# Patient Record
Sex: FEMALE | Race: WHITE | Employment: FULL TIME | URBAN - METROPOLITAN AREA
[De-identification: names, ages, dates, MRNs, and addresses within clinical notes are randomized per-mention and may not be internally consistent; named-entity substitution may affect disease eponyms.]

---
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
## 2019-06-10 ENCOUNTER — OFFICE VISIT (OUTPATIENT)
Dept: FAMILY MEDICINE CLINIC | Age: 19
End: 2019-06-10
Payer: COMMERCIAL

## 2019-06-10 VITALS
WEIGHT: 129.3 LBS | SYSTOLIC BLOOD PRESSURE: 116 MMHG | RESPIRATION RATE: 16 BRPM | DIASTOLIC BLOOD PRESSURE: 76 MMHG | HEIGHT: 67 IN | HEART RATE: 68 BPM | BODY MASS INDEX: 20.29 KG/M2

## 2019-06-10 DIAGNOSIS — Z00.00 WELL ADULT HEALTH CHECK: Primary | ICD-10-CM

## 2019-06-10 PROCEDURE — 99395 PREV VISIT EST AGE 18-39: CPT | Performed by: FAMILY MEDICINE

## 2019-06-10 ASSESSMENT — PATIENT HEALTH QUESTIONNAIRE - PHQ9
SUM OF ALL RESPONSES TO PHQ QUESTIONS 1-9: 0
SUM OF ALL RESPONSES TO PHQ QUESTIONS 1-9: 0
SUM OF ALL RESPONSES TO PHQ9 QUESTIONS 1 & 2: 0
2. FEELING DOWN, DEPRESSED OR HOPELESS: 0
1. LITTLE INTEREST OR PLEASURE IN DOING THINGS: 0

## 2019-06-10 ASSESSMENT — ENCOUNTER SYMPTOMS
RESPIRATORY NEGATIVE: 1
GASTROINTESTINAL NEGATIVE: 1

## 2019-06-10 NOTE — PROGRESS NOTES
Subjective:      Patient ID: Clare Almaguer is a 19 y.o. female. HPI:    Chief Complaint   Patient presents with    Annual Exam    Forms     Nanostellar     Annual eval.  Doing well overall. [employer] physical.    Pt denies CP, SOB, palpitations with exertion. Denies syncopal episodes in the past.  Denies hx of heart murmur. No family hx of early CAD or cardiac death. Denies hx of concussion. Denies hx of asthma. No hx of fractures or restriction of play. Periods regular. There is no problem list on file for this patient. No past surgical history on file.   Prior to Admission medications    Not on File       No results found for: LABA1C  No results found for: EAG    No components found for: CHLPL  No results found for: TRIG  No results found for: HDL  No results found for: LDLCALC  No results found for: LABVLDL      Chemistry    No results found for: NA, K, CL, CO2, BUN, CREATININE No results found for: CALCIUM, ALKPHOS, AST, ALT, BILITOT         No results found for: TSH, R9IGLFX, G2CLWWI, THYROIDAB    No results found for: WBC, HGB, HCT, MCV, PLT      Health Maintenance   Topic Date Due    DTaP/Tdap/Td vaccine (6 - Tdap) 06/19/2009    HIV screen  06/19/2013    Chlamydia screen  06/19/2014    Flu vaccine (Season Ended) 09/01/2019    HPV vaccine  Completed    Varicella Vaccine  Completed    Meningococcal (ACWY) Vaccine  Completed    Pneumococcal 0-64 years Vaccine  Aged [facility] History   Administered Date(s) Administered    DTaP 1998, 1998, 01/06/1999, 12/29/1999, 06/20/2003    HPV Gardasil Quadrivalent 05/31/2012, 08/02/2012, 12/03/2012    Hepatitis B, unspecified formulation 1998, 1998, 01/06/1999    Hib, unspecified formulation 1998, 1998, 01/06/1999, 12/29/1999    IPV (Ipol) 1998, 1998, 06/24/1999, 06/20/2003    MMR 06/24/1999, 06/20/2003    Meningococcal MCV4P (Menactra) 06/27/2011, 06/27/2014    PPD Test

## 2019-06-10 NOTE — PROGRESS NOTES
Visit Information    Have you changed or started any medications since your last visit including any over-the-counter medicines, vitamins, or herbal medicines? no   Are you having any side effects from any of your medications? -  no  Have you stopped taking any of your medications? Is so, why? -  no    Have you seen any other physician or provider since your last visit? No  Have you had any other diagnostic tests since your last visit? No  Have you been seen in the emergency room and/or had an admission to a hospital since we last saw you? No  Have you had your routine dental cleaning in the past 6 months? yes - couple weeks ago    Have you activated your [employer] account? If not, what are your barriers?  Yes     Patient Care Team:  Yomaira Mccain DO as PCP - General (Family Medicine)  Yomaira Mccain DO as PCP - [facility] Provider    Medical History Review  Past Medical, Family, and Social History reviewed and does not contribute to the patient presenting condition    Health Maintenance   Topic Date Due    DTaP/Tdap/Td vaccine (6 - Tdap) 06/19/2009    HIV screen  06/19/2013    Chlamydia screen  06/19/2014    Flu vaccine (Season Ended) 09/01/2019    HPV vaccine  Completed    Varicella Vaccine  Completed    Meningococcal (ACWY) Vaccine  Completed    Pneumococcal 0-64 years Vaccine  Aged Out

## 2019-06-12 LAB — MISCELLANEOUS LAB TEST ORDER: NORMAL

## 2019-06-16 ENCOUNTER — HOSPITAL ENCOUNTER (EMERGENCY)
Age: 19
Discharge: HOME | End: 2019-06-16
Payer: COMMERCIAL

## 2019-06-16 VITALS
WEIGHT: 130 LBS | HEART RATE: 115 BPM | HEIGHT: 67 IN | TEMPERATURE: 98 F | OXYGEN SATURATION: 100 % | BODY MASS INDEX: 20.4 KG/M2 | SYSTOLIC BLOOD PRESSURE: 140 MMHG | DIASTOLIC BLOOD PRESSURE: 76 MMHG | RESPIRATION RATE: 16 BRPM

## 2019-06-16 DIAGNOSIS — J01.00 ACUTE MAXILLARY SINUSITIS, RECURRENCE NOT SPECIFIED: Primary | ICD-10-CM

## 2019-06-16 PROCEDURE — 99214 OFFICE O/P EST MOD 30 MIN: CPT | Performed by: NURSE PRACTITIONER

## 2019-06-16 PROCEDURE — 99213 OFFICE O/P EST LOW 20 MIN: CPT

## 2019-06-16 RX ORDER — PREDNISONE 20 MG/1
40 TABLET ORAL DAILY
Qty: 10 TABLET | Refills: 0 | Status: SHIPPED | OUTPATIENT
Start: 2019-06-16 | End: 2019-06-21

## 2019-06-16 RX ORDER — AMOXICILLIN AND CLAVULANATE POTASSIUM 875; 125 MG/1; MG/1
1 TABLET, FILM COATED ORAL 2 TIMES DAILY
Qty: 14 TABLET | Refills: 0 | Status: SHIPPED | OUTPATIENT
Start: 2019-06-16 | End: 2019-06-23

## 2019-06-16 ASSESSMENT — ENCOUNTER SYMPTOMS
COUGH: 1
NAUSEA: 0
SINUS PRESSURE: 1
RHINORRHEA: 0
CHEST TIGHTNESS: 0
SHORTNESS OF BREATH: 0
SORE THROAT: 1
VOMITING: 0
DIARRHEA: 0
SINUS PAIN: 0

## 2019-06-16 ASSESSMENT — PAIN DESCRIPTION - LOCATION: LOCATION: CHEST

## 2019-06-16 ASSESSMENT — PAIN SCALES - GENERAL: PAINLEVEL_OUTOF10: 5

## 2019-06-16 NOTE — ED PROVIDER NOTES
[place] [address]  Urgent Care Encounter       CHIEF COMPLAINT       Chief Complaint   Patient presents with    Cough    Headache       Nurses Notes reviewed and I agree except as noted in the HPI. HISTORY OF PRESENT Nadeen Jiménez is a 19 y.o. female who presents with complaints of cough and sinus headaches for the past 2 days. Patient reports sinus pressure around her eyes and a sore throat as well. She reports clear nasal discharge. Appetite has been decreased today. No otalgia, nausea or vomiting. She does report feeling hot and having occasional chills and body aches. The history is provided by the patient. REVIEW OF SYSTEMS     Review of Systems   Constitutional: Positive for appetite change and chills. Negative for fatigue and fever. HENT: Positive for congestion, postnasal drip, sinus pressure and sore throat. Negative for ear pain, rhinorrhea and sinus pain. Respiratory: Positive for cough. Negative for chest tightness and shortness of breath. Cardiovascular: Negative for chest pain. Gastrointestinal: Negative for diarrhea, nausea and vomiting. Musculoskeletal: Positive for myalgias. Neurological: Positive for headaches. Negative for dizziness. PAST MEDICAL HISTORY   History reviewed. No pertinent past medical history. SURGICALHISTORY     Patient  has no past surgical history on file. CURRENT MEDICATIONS       Discharge Medication List as of 6/16/2019  6:00 PM          ALLERGIES     Patient is has No Known Allergies.     Patients   Immunization History   Administered Date(s) Administered    DTaP 1998, 1998, 01/06/1999, 12/29/1999, 06/20/2003    HPV Gardasil Quadrivalent 05/31/2012, 08/02/2012, 12/03/2012    Hepatitis B, unspecified formulation 1998, 1998, 01/06/1999    Hib, unspecified formulation 1998, 1998, 01/06/1999, 12/29/1999    IPV (Ipol) 1998, 1998, 06/24/1999, 06/20/2003    MMR 06/24/1999, 06/20/2003    Meningococcal MCV4P (Menactra) 06/27/2011, 06/27/2014    PPD Test 06/24/1999    Varicella (Varivax) 06/24/1999, 06/23/2010       FAMILY HISTORY     Patient's family history includes Cancer in her maternal grandmother; Other in her mother. SOCIAL HISTORY     Patient  reports that she has never smoked. She has never used smokeless tobacco.    PHYSICAL EXAM     ED TRIAGE VITALS  BP: (!) 140/76, Temp: 98 °F (36.7 °C), Pulse: 115, Resp: 16, SpO2: 100 %,Estimated body mass index is 20.36 kg/m² as calculated from the following:    Height as of this encounter: 5' 7\" (1.702 m). Weight as of this encounter: 130 lb (59 kg). ,Patient's last menstrual period was 06/01/2019. Physical Exam   Constitutional: She is oriented to person, place, and time. She appears well-developed and well-nourished. She is cooperative. She does not appear ill. No distress. HENT:   Head: Normocephalic and atraumatic. Right Ear: Tympanic membrane and ear canal normal.   Left Ear: Tympanic membrane and ear canal normal.   Nose: Right sinus exhibits maxillary sinus tenderness. Right sinus exhibits no frontal sinus tenderness. Left sinus exhibits maxillary sinus tenderness. Left sinus exhibits no frontal sinus tenderness. Mouth/Throat: Uvula is midline and mucous membranes are normal. Posterior oropharyngeal erythema present. Eyes: Lids are normal. Right conjunctiva is not injected. Left conjunctiva is not injected. No scleral icterus. Pupils are equal.   Cardiovascular: Normal rate, regular rhythm, S1 normal, S2 normal and normal heart sounds. No pedal edema   Pulmonary/Chest: Effort normal and breath sounds normal. No respiratory distress. Musculoskeletal:   Normal active ROM x 4 extremities  Gait steady   Lymphadenopathy:        Head (right side): Tonsillar adenopathy present. She has no cervical adenopathy. Neurological: She is alert and oriented to person, place, and time. No sensory deficit. Answers questions readily and appropriately   Skin: Skin is warm and dry. No rash (to exposed areas of skin) noted. No cyanosis. Nails show no clubbing. Psychiatric: She has a normal mood and affect. Her speech is normal and behavior is normal.   Nursing note and vitals reviewed. DIAGNOSTIC RESULTS     Labs:No results found for this visit on 06/16/19. IMAGING:    No orders to display         URGENT CARE COURSE:     Vitals:    06/16/19 1725 06/16/19 1726   BP: (!) 140/76    Pulse: 122 115   Resp: 16    Temp: 98 °F (36.7 °C)    TempSrc: Temporal    SpO2: 100%    Weight: 130 lb (59 kg)    Height: 5' 7\" (1.702 m)        Medications - No data to display         PROCEDURES:  None    FINAL IMPRESSION      1. Acute maxillary sinusitis, recurrence not specified          DISPOSITION/ PLAN     Plenty of fluids orally. Salt water gargles as needed for sore throat or OTC throat spray/lozenges. Cool mist humidifier at bedside for congestion. Saline rinses as needed for nasal congestion. May take Tylenol and/or Ibuprofen for fever or body aches as directed. May take OTC antihistamines/ decongestant as needed. Follow up with family doctor as needed. Pt to go to ER if symptoms worsen, new symptoms develop, high fever >102, vomiting, breathing difficulty, lethargy. Take antibiotic as prescribed until gone. Do not stop taking even if your symptoms have improved. Prednisone as prescribed for the full 5 days. Patient with acute sinusitis and will be treated with Augmentin ×7 days and prednisone burst ×5 days. Continue with the over-the-counter decongestants as desired. Follow-up with family doctor in the next week if not improved. Further instructions outlined above. All the patient's questions answered. The patient/parent agreed with the plan. The patient was discharged from the [facility] in good condition.       PATIENT REFERRED TO:  [facility] [address] / [facility] [address]      DISCHARGE MEDICATIONS:  Discharge Medication List as of 6/16/2019  6:00 PM      START taking these medications    Details   amoxicillin-clavulanate (AUGMENTIN) 875-125 MG per tablet Take 1 tablet by mouth 2 times daily for 7 days, Disp-14 tablet, R-0Normal      predniSONE (DELTASONE) 20 MG tablet Take 2 tablets by mouth daily for 5 days, Disp-10 tablet, R-0Normal             Discharge Medication List as of 6/16/2019  6:00 PM          Discharge Medication List as of 6/16/2019  6:00 PM          TL Lindsay CNP    (Please note that portions of this note were completed with a voice recognition program. Efforts were made to edit the dictations but occasionally words are mis-transcribed.)         TL Lindsay CNP  06/17/19 8783

## 2019-06-17 ENCOUNTER — TELEPHONE (OUTPATIENT)
Dept: FAMILY MEDICINE CLINIC | Age: 19
End: 2019-06-17

## 2019-06-24 ENCOUNTER — TELEPHONE (OUTPATIENT)
Dept: FAMILY MEDICINE CLINIC | Age: 19
End: 2019-06-24

## 2019-06-25 NOTE — TELEPHONE ENCOUNTER
Mikayla Letters called back and asked for me to mail results to [address], [address] [place] [address]. Mailed today.

## 2019-07-01 ENCOUNTER — TELEPHONE (OUTPATIENT)
Dept: FAMILY MEDICINE CLINIC | Age: 19
End: 2019-07-01

## 2019-07-30 ENCOUNTER — TELEPHONE (OUTPATIENT)
Dept: FAMILY MEDICINE CLINIC | Age: 19
End: 2019-07-30

## 2019-07-30 RX ORDER — NORGESTIMATE AND ETHINYL ESTRADIOL 7DAYSX3 28
1 KIT ORAL DAILY
Qty: 30 TABLET | Refills: 11 | Status: SHIPPED | OUTPATIENT
Start: 2019-07-30 | End: 2021-05-28

## 2019-07-30 NOTE — TELEPHONE ENCOUNTER
The patients mom called in and stated that the patient was in 6/10/19 for a physical for the [employer], mom stated in the past birth control was discussed, mom stated that the patient is wanting to start on birth control due to her heavy bleeding and having issues with leaking with her training. The pharmacy is [facility]. Please advise.     Called elen Casillas back at [phone]

## 2019-07-31 ENCOUNTER — TELEPHONE (OUTPATIENT)
Dept: FAMILY MEDICINE CLINIC | Age: 19
End: 2019-07-31

## 2019-07-31 ENCOUNTER — OFFICE VISIT (OUTPATIENT)
Dept: FAMILY MEDICINE CLINIC | Age: 19
End: 2019-07-31
Payer: COMMERCIAL

## 2019-07-31 VITALS
DIASTOLIC BLOOD PRESSURE: 60 MMHG | SYSTOLIC BLOOD PRESSURE: 102 MMHG | HEIGHT: 68 IN | WEIGHT: 127.3 LBS | BODY MASS INDEX: 19.29 KG/M2 | HEART RATE: 68 BPM | RESPIRATION RATE: 12 BRPM

## 2019-07-31 DIAGNOSIS — S06.0X1A CONCUSSION WITH LOSS OF CONSCIOUSNESS OF 30 MINUTES OR LESS, INITIAL ENCOUNTER: ICD-10-CM

## 2019-07-31 DIAGNOSIS — R07.89 CHEST TIGHTNESS OR PRESSURE: ICD-10-CM

## 2019-07-31 DIAGNOSIS — R94.31 ABNORMAL EKG: ICD-10-CM

## 2019-07-31 DIAGNOSIS — R55 SYNCOPE AND COLLAPSE: Primary | ICD-10-CM

## 2019-07-31 LAB
ABSOLUTE BASO #: 0 /CMM (ref 0–200)
ABSOLUTE EOS #: 0 /CMM (ref 0–500)
ABSOLUTE LYMPH #: 1100 /CMM (ref 1000–4800)
ABSOLUTE MONO #: 400 /CMM (ref 0–800)
ABSOLUTE NEUT #: 4000 /CMM (ref 1800–7700)
ANION GAP SERPL CALCULATED.3IONS-SCNC: 9 MMOL/L (ref 4–12)
BASOPHILS RELATIVE PERCENT: 0.4 % (ref 0–2)
BUN BLDV-MCNC: 16 MG/DL (ref 7–20)
CALCIUM SERPL-MCNC: 9.4 MG/DL (ref 8.8–10.5)
CHLORIDE BLD-SCNC: 106 MEQ/L (ref 101–111)
CO2: 25 MEQ/L (ref 21–32)
CREAT SERPL-MCNC: 0.89 MG/DL (ref 0.6–1.3)
CREATININE CLEARANCE: >60
EOSINOPHILS RELATIVE PERCENT: 0.2 % (ref 0–6)
GLUCOSE: 92 MG/DL (ref 70–110)
HCT VFR BLD CALC: 42 % (ref 35–44)
HEMOGLOBIN: 14 GM/DL (ref 12–15)
LYMPHOCYTES RELATIVE PERCENT: 20.2 % (ref 15–45)
MCH RBC QN AUTO: 30.7 PG (ref 27.5–33)
MCHC RBC AUTO-ENTMCNC: 33.3 GM/DL (ref 33–36)
MCV RBC AUTO: 92 CU MIC (ref 80–97)
MONOCYTES RELATIVE PERCENT: 7.5 % (ref 2–10)
NEUTROPHILS RELATIVE PERCENT: 71.7 % (ref 40–70)
NUCLEATED RBCS: 0 /100 WBC
PDW BLD-RTO: 12.9 % (ref 12–16)
PLATELET # BLD: 198 TH/CMM (ref 150–400)
POTASSIUM SERPL-SCNC: 3.9 MEQ/L (ref 3.6–5)
RBC # BLD: 4.56 MIL/CMM (ref 4–5.1)
SODIUM BLD-SCNC: 140 MEQ/L (ref 135–145)
TSH SERPL DL<=0.05 MIU/L-ACNC: 1.4 MCIU/ML (ref 0.49–4.67)
WBC # BLD: 5.5 TH/CMM (ref 4.4–10.5)

## 2019-07-31 PROCEDURE — 99215 OFFICE O/P EST HI 40 MIN: CPT | Performed by: FAMILY MEDICINE

## 2019-07-31 PROCEDURE — 93000 ELECTROCARDIOGRAM COMPLETE: CPT | Performed by: FAMILY MEDICINE

## 2019-07-31 ASSESSMENT — ENCOUNTER SYMPTOMS
CHEST TIGHTNESS: 1
VOMITING: 0
NAUSEA: 1

## 2019-07-31 NOTE — TELEPHONE ENCOUNTER
Cardiology spoke with Deborah for patient to be seen this week only provider available is Trina Agarwal CNP. Please review and advise. See CNP or keep 8/12/19 appointment.

## 2019-07-31 NOTE — TELEPHONE ENCOUNTER
Per  cardiology spoke and scheduled patient for 8/12/19. LM again for cardiology to call the office to try and get patient in this week.

## 2019-07-31 NOTE — PROGRESS NOTES
Subjective:      Patient ID: Paul Tenorio is a 22 y.o. female. HPI:    Chief Complaint   Patient presents with    Loss of Consciousness     hit head on concrete yesterday running for [employer]    Headache     nausea    Nausea    Blurred Vision     Pt here for syncopal episode that happened 2 days. Was running 2 miles for [employer] and passed out. Pt states that her chest started bothering her, got really tight and then she passed out. She runs everyday. Has never had any episode like this before. Was running at her regular pace. She ate that day, she was drinking a lot of water. Temp was 90 degrees. She remembers feeling lightheaded and remembers coming to. She believes she was out a few minutes. Since the incident, she has had HA, nausea and some blurred vision. She did go to class after the incident and struggled to concentrate. Boyfriend drove her here today. She hit her head on the concrete. She is sleeping ok but feels very tired. No family hx of early cardiac disease. Pt states that she had this similar episode in Feb, was at the gym and passed out. 12 lead at fire station, uncertain what it showed. Vitals stable. Vitals:    07/31/19 1135   BP: 102/60   Pulse: 68   Resp: 12     She is under more stress. There is no problem list on file for this patient. No past surgical history on file. Prior to Admission medications    Medication Sig Start Date End Date Taking? Authorizing Provider   Norgestim-Eth Estrad Triphasic (ORTHO TRI-CYCLEN, 28,) 0.18/0.215/0.25 MG-35 MCG TABS Take 1 tablet by mouth daily  Patient not taking: Reported on 7/31/2019 7/30/19   Farhad Tracy DO         Review of Systems   Constitutional: Negative. HENT: Negative. Eyes: Positive for visual disturbance. Respiratory: Positive for chest tightness. Cardiovascular: Positive for chest pain. Gastrointestinal: Positive for nausea.  Negative for

## 2019-08-01 ENCOUNTER — OFFICE VISIT (OUTPATIENT)
Dept: CARDIOLOGY CLINIC | Age: 19
End: 2019-08-01
Payer: COMMERCIAL

## 2019-08-01 VITALS
SYSTOLIC BLOOD PRESSURE: 131 MMHG | WEIGHT: 125 LBS | DIASTOLIC BLOOD PRESSURE: 86 MMHG | BODY MASS INDEX: 18.94 KG/M2 | HEART RATE: 86 BPM | HEIGHT: 68 IN

## 2019-08-01 DIAGNOSIS — R55 SYNCOPE, UNSPECIFIED SYNCOPE TYPE: ICD-10-CM

## 2019-08-01 DIAGNOSIS — R42 DIZZINESS: Primary | ICD-10-CM

## 2019-08-01 PROCEDURE — 99204 OFFICE O/P NEW MOD 45 MIN: CPT | Performed by: INTERNAL MEDICINE

## 2019-08-01 NOTE — PROGRESS NOTES
New patient here for check up ref by Dr. Ivanna Vidal syncope and collapse    Pt c/o chest pain, last had 4 days ago , notices when active, rates pain 7-8/10, describes as chest tightness , dizziness, collapsing with activity last noticed 4 days ago     Pt denies heart palpitations, sob, peripheral edema,
for: TRIG, HDL, LDLCALC, LDLDIRECT, LABVLDL    Lab Results   Component Value Date    TSH 1.398 07/31/2019         Testing Reviewed:      I haveindividually reviewed the below cardiac tests    EKG:    ECHO: No results found for this or any previous visit. STRESS:    CATH:    Assessment/Plan       Diagnosis Orders   1. Dizziness  Holter monitor 48 hour    ECHO Complete 2D W Doppler W Color    CARDIAC STRESS TEST EXERCISE ONLY   2. Syncope, unspecified syncope type  Holter monitor 48 hour    ECHO Complete 2D W Doppler W Color    CARDIAC STRESS TEST EXERCISE ONLY     Chest pain, with exertion  Syncope    States she works a lot   Under some stress  Will get exercise stress test and echo  Will get 48 hrs holter  Advised to stay well hydrated  Advised to get good night sleep  Follow up after testing  The patient is asked to make an attempt to improve diet and exercise patterns to aid in medical management of this problem. Advised more plant based nutrition/meditarrean diet   Advised patient to call office or seek immediate medical attention if there is any new onset of  any chest pain, sob, palpitations, lightheadedness, dizziness, orthopnea, PND or pedal edema. All medication side effects were discussed in details. Thank youfor allowing me to participate in the care of this patient. Please do not hesitate to contact me for any further questions. No follow-ups on file.        Electronically signed by Chris Zapata MD [facility]  8/4/2019 at 3:16 PM

## 2019-08-06 ENCOUNTER — HOSPITAL ENCOUNTER (OUTPATIENT)
Dept: NON INVASIVE DIAGNOSTICS | Age: 19
Discharge: HOME | End: 2019-08-06
Payer: COMMERCIAL

## 2019-08-06 ENCOUNTER — TELEPHONE (OUTPATIENT)
Dept: CARDIOLOGY CLINIC | Age: 19
End: 2019-08-06

## 2019-08-06 DIAGNOSIS — R42 DIZZINESS: Primary | ICD-10-CM

## 2019-08-06 DIAGNOSIS — R42 DIZZINESS: ICD-10-CM

## 2019-08-06 DIAGNOSIS — R55 SYNCOPE, UNSPECIFIED SYNCOPE TYPE: ICD-10-CM

## 2019-08-06 DIAGNOSIS — R07.89 OTHER CHEST PAIN: ICD-10-CM

## 2019-08-06 DIAGNOSIS — M79.602 LEFT ARM PAIN: ICD-10-CM

## 2019-08-06 LAB
LV EF: 58 %
LVEF MODALITY: NORMAL

## 2019-08-06 PROCEDURE — 93226 XTRNL ECG REC<48 HR SCAN A/R: CPT

## 2019-08-06 PROCEDURE — 93017 CV STRESS TEST TRACING ONLY: CPT | Performed by: INTERNAL MEDICINE

## 2019-08-06 PROCEDURE — 93225 XTRNL ECG REC<48 HRS REC: CPT

## 2019-08-06 PROCEDURE — 93306 TTE W/DOPPLER COMPLETE: CPT

## 2019-08-07 NOTE — TELEPHONE ENCOUNTER
Verbal order Dr Roxana Rubinstein:  Please order Coronary CTA. Ordered and given to scheduling. Please agree with verbal order.

## 2019-08-12 ENCOUNTER — HOSPITAL ENCOUNTER (OUTPATIENT)
Dept: CT IMAGING | Age: 19
Discharge: HOME | End: 2019-08-12
Payer: COMMERCIAL

## 2019-08-12 ENCOUNTER — OFFICE VISIT (OUTPATIENT)
Dept: CARDIOLOGY CLINIC | Age: 19
End: 2019-08-12
Payer: COMMERCIAL

## 2019-08-12 VITALS — DIASTOLIC BLOOD PRESSURE: 76 MMHG | SYSTOLIC BLOOD PRESSURE: 113 MMHG | HEART RATE: 77 BPM

## 2019-08-12 VITALS
WEIGHT: 128.13 LBS | DIASTOLIC BLOOD PRESSURE: 72 MMHG | BODY MASS INDEX: 19.42 KG/M2 | HEART RATE: 68 BPM | SYSTOLIC BLOOD PRESSURE: 124 MMHG | HEIGHT: 68 IN

## 2019-08-12 DIAGNOSIS — R07.9 CHEST PAIN, UNSPECIFIED TYPE: Primary | ICD-10-CM

## 2019-08-12 DIAGNOSIS — R42 DIZZINESS: ICD-10-CM

## 2019-08-12 DIAGNOSIS — M79.602 LEFT ARM PAIN: ICD-10-CM

## 2019-08-12 DIAGNOSIS — R07.89 OTHER CHEST PAIN: ICD-10-CM

## 2019-08-12 LAB
ACQUISITION DURATION: NORMAL S
AVERAGE HEART RATE: 79 BPM
HOOKUP DATE: NORMAL
HOOKUP TIME: NORMAL
MAX HEART RATE TIME/DATE: NORMAL
MAX HEART RATE: 167 BPM
MIN HEART RATE TIME/DATE: NORMAL
MIN HEART RATE: 43 BPM
NUMBER OF QRS COMPLEXES: NORMAL
NUMBER OF SUPRAVENTRICULAR BEATS IN RUNS: 0
NUMBER OF SUPRAVENTRICULAR COUPLETS: 0
NUMBER OF SUPRAVENTRICULAR ECTOPICS: 0
NUMBER OF SUPRAVENTRICULAR ISOLATED BEATS: 0
NUMBER OF SUPRAVENTRICULAR RUNS: 0
NUMBER OF VENTRICULAR BEATS IN RUNS: 0
NUMBER OF VENTRICULAR BIGEMINAL CYCLES: 0
NUMBER OF VENTRICULAR COUPLETS: 0
NUMBER OF VENTRICULAR ECTOPICS: 0
NUMBER OF VENTRICULAR ISOLATED BEATS: 0
NUMBER OF VENTRICULAR RUNS: 0

## 2019-08-12 PROCEDURE — 6360000004 HC RX CONTRAST MEDICATION: Performed by: INTERNAL MEDICINE

## 2019-08-12 PROCEDURE — 99213 OFFICE O/P EST LOW 20 MIN: CPT | Performed by: INTERNAL MEDICINE

## 2019-08-12 PROCEDURE — 75574 CT ANGIO HRT W/3D IMAGE: CPT

## 2019-08-12 PROCEDURE — 2500000003 HC RX 250 WO HCPCS

## 2019-08-12 RX ORDER — NITROGLYCERIN 0.4 MG/1
0.4 TABLET SUBLINGUAL ONCE
Status: COMPLETED | OUTPATIENT
Start: 2019-08-12 | End: 2019-08-12

## 2019-08-12 RX ORDER — METOPROLOL TARTRATE 5 MG/5ML
5 INJECTION INTRAVENOUS EVERY 5 MIN PRN
Status: DISCONTINUED | OUTPATIENT
Start: 2019-08-12 | End: 2019-08-13 | Stop reason: HOSPADM

## 2019-08-12 RX ADMIN — NITROGLYCERIN 0.4 MG: 0.4 TABLET SUBLINGUAL at 09:54

## 2019-08-12 RX ADMIN — IOPAMIDOL 80 ML: 755 INJECTION, SOLUTION INTRAVENOUS at 09:41

## 2019-08-12 RX ADMIN — METOPROLOL TARTRATE 5 MG: 5 INJECTION INTRAVENOUS at 09:48

## 2019-08-12 NOTE — PROGRESS NOTES
PM   ----------------------------------------------------------------      Findings      Mitral Valve   The mitral valve structure is normal with normal leaflet separation. DOPPLER: The transmitral velocity was within the normal range with no   evidence for mitral stenosis. There was no evidence of mitral   regurgitation. Aortic Valve   The aortic valve leaflets were not well visualized. The aortic valve appears to be trileaflet with good leaflet separation. DOPPLER: Transaortic velocity was within the normal range with no evidence   of aortic stenosis. There was no evidence of aortic regurgitation. Tricuspid Valve   The tricuspid valve structure is normal with normal leaflet separation. DOPPLER: There is no evidence of tricuspid stenosis. Mild tricuspid regurgitation. Pulmonic Valve   The pulmonic valve leaflets appear normal thickness, and normal cuspal   separation. DOPPLER: The transpulmonic velocity was within the normal   range. No evidence for regurgitation. Left Atrium   Left atrial size is normal.      Left Ventricle   Left ventricular size and systolic function is normal. Ejection fraction   was estimated at 55-60%. LV wall thickness is within normal limits and   there are no obvious wall motion abnormalities. Right Atrium   Right atrial size was normal.      Right Ventricle   The right ventricular size appears normal with normal systolic function   and wall thickness. Pericardial Effusion   The pericardium appears normal with no evidence of a pericardial effusion. Pleural Effusion   No evidence of pleural effusion. Aorta / Great Vessels   -Aortic root dimension within normal limits. -IVC size is within normal limits with normal respiratory phasic changes.      M-Mode/2D Measurements & Calculations      LV Diastolic   LV Systolic Dimension:    AV Cusp Separation: 1.4 cmLA   Dimension: 4.1 2.8 cm                    Dimension: 2.8 cmAO Root   cm pain, with exertion  Syncope     States she works a lot   Under some stress  Reviewed EKG, Echo, Stress test and Holter  Advised to stay well hydrated  Advised to get good night sleep  Will get CTA coronaries, will try to move it up sooner  Advised to decrease activity and to increase fluid intake  Advised to avoid situations in which symptoms arise  Patient verbalized understanding and agreed to do so. The patient is asked to make an attempt to improve diet and exercise patterns to aid in medical management of this problem. Advised more plant based nutrition/meditarrean diet   Advised patient to call office or seek immediate medical attention if there is any new onset of  any chest pain, sob, palpitations, lightheadedness, dizziness, orthopnea, PND or pedal edema. All medication side effects were discussed in details. Thank youfor allowing me to participate in the care of this patient. Please do not hesitate to contact me for any further questions. No follow-ups on file.        Electronically signed by Jeffrey David MD [address]  8/12/2019 at 8:42 AM

## 2019-08-12 NOTE — PROGRESS NOTES
Pt here for fu holter monitor, stress and echo     Pt continues with dizziness , passed out yesterday , chest tightness,     No other c/o

## 2019-09-06 ENCOUNTER — OFFICE VISIT (OUTPATIENT)
Dept: CARDIOLOGY CLINIC | Age: 19
End: 2019-09-06
Payer: COMMERCIAL

## 2019-09-06 VITALS
BODY MASS INDEX: 19.55 KG/M2 | HEART RATE: 72 BPM | WEIGHT: 129 LBS | HEIGHT: 68 IN | SYSTOLIC BLOOD PRESSURE: 124 MMHG | DIASTOLIC BLOOD PRESSURE: 80 MMHG

## 2019-09-06 DIAGNOSIS — R07.9 CHEST PAIN AT REST: Primary | ICD-10-CM

## 2019-09-06 PROCEDURE — 99213 OFFICE O/P EST LOW 20 MIN: CPT | Performed by: INTERNAL MEDICINE

## 2019-09-06 NOTE — PROGRESS NOTES
results found for: TRIG, HDL, LDLCALC, LDLDIRECT, LABVLDL    Lab Results   Component Value Date    TSH 1.398 07/31/2019         Testing Reviewed:      I haveindividually reviewed the below cardiac tests    EKG:    ECHO:   Results for orders placed during the hospital encounter of 08/06/19   ECHO Complete 2D W Doppler W Color    Narrative Transthoracic Echocardiography Report (TTE)     Demographics      Patient Name  Eduardo Washington Gender             Female                 M      MR #          [id number]      Race                                                  Ethnicity      Account #     [de-identified]      Room Number      Accession     [id number]      Date of Study      08/06/2019   Number      Date of Birth 2000     Referring          Lion Goodwin DO                                Physician          Opal Silvestre MD      Age           22 year(s)     Sonographer        LINDSAY Armstrong, ROD,                                                   RDMS, RVT                                   Interpreting       Opal Silvestre MD                                Physician     Procedure    Type of Study      TTE procedure:ECHOCARDIOGRAM COMPLETE 2D W DOPPLER W COLOR. Procedure Date  Date: 08/06/2019 Start: 08:16 AM    Study Location: Echo Lab  Technical Quality: Adequate visualization    Indications:Syncope. Additional Medical History:chest pain, dizziness    Patient Status: Routine    Height: 67 inches Weight: 125 pounds BSA: 1.66 m^2 BMI: 19.58 kg/m^2    BP: 131/86 mmHg     Conclusions      Summary   Left ventricular size and systolic function is normal. Ejection fraction   was estimated at 55-60%. LV wall thickness is within normal limits and   there are no obvious wall motion abnormalities. Mild tricuspid regurgitation.       Signature      ----------------------------------------------------------------   Electronically signed by Opal Silvestre MD (Interpreting   physician) on 08/06/2019 at 07:16 with exertion  Syncope     States she works a lot   Under some stress  Reviewed EKG, Echo, Stress test and Holter  Advised to stay well hydrated  Advised to get good night sleep  Will get CTA coronaries, will try to move it up sooner  Advised to decrease activity and to increase fluid intake  Advised to avoid situations in which symptoms arise  Patient verbalized understanding and agreed to do so. The patient is asked to make an attempt to improve diet and exercise patterns to aid in medical management of this problem. Advised more plant based nutrition/meditarrean diet   Advised patient to call office or seek immediate medical attention if there is any new onset of  any chest pain, sob, palpitations, lightheadedness, dizziness, orthopnea, PND or pedal edema. All medication side effects were discussed in details. No follow-ups on file.        Electronically signed by Ramya Shannon MD [address]  9/22/2019 at 11:39 AM

## 2019-09-06 NOTE — LETTER
[address] [facility]  [facility].  [address]  [facility] [address]  Phone: [phone]  Fax: [phone]    Choima Madison MD        September 6, 2019      To Whom It May Concern: It is in my medical opinion, from a cardiac standpoint, Zane Carnes (2000) shall refrain from physical activity until appointment on September 18, 2019.         Sincerely,        Chioma Madison MD

## 2019-09-18 ENCOUNTER — OFFICE VISIT (OUTPATIENT)
Dept: CARDIOLOGY CLINIC | Age: 19
End: 2019-09-18
Payer: COMMERCIAL

## 2019-09-18 VITALS
DIASTOLIC BLOOD PRESSURE: 70 MMHG | WEIGHT: 128 LBS | BODY MASS INDEX: 19.4 KG/M2 | HEART RATE: 84 BPM | SYSTOLIC BLOOD PRESSURE: 128 MMHG | HEIGHT: 68 IN

## 2019-09-18 DIAGNOSIS — R00.2 PALPITATIONS: Primary | ICD-10-CM

## 2019-09-18 PROBLEM — R42 DIZZINESS: Status: ACTIVE | Noted: 2019-09-18

## 2019-09-18 PROBLEM — M79.602 PAIN IN LEFT ARM: Status: ACTIVE | Noted: 2019-09-18

## 2019-09-18 PROBLEM — R07.9 CHEST PAIN: Status: ACTIVE | Noted: 2019-09-18

## 2019-09-18 PROCEDURE — 99213 OFFICE O/P EST LOW 20 MIN: CPT | Performed by: INTERNAL MEDICINE

## 2019-09-18 NOTE — PROGRESS NOTES
MD (Interpreting   physician) on 08/06/2019 at 07:16 PM   ----------------------------------------------------------------      Findings      Mitral Valve   The mitral valve structure is normal with normal leaflet separation. DOPPLER: The transmitral velocity was within the normal range with no   evidence for mitral stenosis. There was no evidence of mitral   regurgitation. Aortic Valve   The aortic valve leaflets were not well visualized. The aortic valve appears to be trileaflet with good leaflet separation. DOPPLER: Transaortic velocity was within the normal range with no evidence   of aortic stenosis. There was no evidence of aortic regurgitation. Tricuspid Valve   The tricuspid valve structure is normal with normal leaflet separation. DOPPLER: There is no evidence of tricuspid stenosis. Mild tricuspid regurgitation. Pulmonic Valve   The pulmonic valve leaflets appear normal thickness, and normal cuspal   separation. DOPPLER: The transpulmonic velocity was within the normal   range. No evidence for regurgitation. Left Atrium   Left atrial size is normal.      Left Ventricle   Left ventricular size and systolic function is normal. Ejection fraction   was estimated at 55-60%. LV wall thickness is within normal limits and   there are no obvious wall motion abnormalities. Right Atrium   Right atrial size was normal.      Right Ventricle   The right ventricular size appears normal with normal systolic function   and wall thickness. Pericardial Effusion   The pericardium appears normal with no evidence of a pericardial effusion. Pleural Effusion   No evidence of pleural effusion. Aorta / Great Vessels   -Aortic root dimension within normal limits. -IVC size is within normal limits with normal respiratory phasic changes.      M-Mode/2D Measurements & Calculations      LV Diastolic   LV Systolic Dimension:    AV Cusp Separation: 1.4 cmLA   Dimension: 4.1 2.8 cm Orders   1. Palpitations         Chest pain/dizziness, with exertion  Syncope     States she works a lot   Under some stress  Reviewed EKG, Echo, Stress test and Holter  Reviewed CTA chest  Advised to stay well hydrated  Advised to get good night sleep  Placed on beta blocker since her sx are at higher HR  Today she states her symptoms have improved  Advised to decrease activity and to increase fluid intake  Advised to avoid situations in which symptoms arise  Patient verbalized understanding and agreed to do so.   The patient is asked to make an attempt to improve diet and exercise patterns to aid in medical management of this problem. Advised more plant based nutrition/meditarrean diet   Advised patient to call office or seek immediate medical attention if there is any new onset of  any chest pain, sob, palpitations, lightheadedness, dizziness, orthopnea, PND or pedal edema. All medication side effects were discussed in details.     Thank youfor allowing me to participate in the care of this patient. Please do not hesitate to contact me for any further questions. Return in about 6 months (around 3/18/2020), or if symptoms worsen or fail to improve, for Review testing, Regular follow up.        Electronically signed by Lenin Grayson MD [facility] [place]  9/22/2019 at 11:11 AM

## 2019-09-18 NOTE — LETTER
[address] [facility]  [facility] [address] [facility] [address] [facility] [address]  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 If you have any questions or concerns, please don't hesitate to call.     Sincerely,        Chioma Madison MD

## 2019-09-25 ENCOUNTER — TELEPHONE (OUTPATIENT)
Dept: CARDIOLOGY CLINIC | Age: 19
End: 2019-09-25

## 2019-11-06 ENCOUNTER — TELEPHONE (OUTPATIENT)
Dept: CARDIOLOGY CLINIC | Age: 19
End: 2019-11-06

## 2020-05-29 ENCOUNTER — OFFICE VISIT (OUTPATIENT)
Dept: CARDIOLOGY CLINIC | Age: 20
End: 2020-05-29
Payer: COMMERCIAL

## 2020-05-29 VITALS — WEIGHT: 150 LBS | DIASTOLIC BLOOD PRESSURE: 88 MMHG | SYSTOLIC BLOOD PRESSURE: 130 MMHG | BODY MASS INDEX: 22.81 KG/M2

## 2020-05-29 PROCEDURE — 99213 OFFICE O/P EST LOW 20 MIN: CPT | Performed by: INTERNAL MEDICINE

## 2020-05-29 RX ORDER — PNV NO.95/FERROUS FUM/FOLIC AC 28MG-0.8MG
1 TABLET ORAL DAILY
COMMUNITY
Start: 2020-05-26 | End: 2021-05-28

## 2020-05-29 NOTE — PROGRESS NOTES
Psychiatric/Behavioral: denies agitation, confusion, decreased concentration and dysphoric mood    All others reviewed and are negative. Objective:     /88   Wt 150 lb (68 kg)   BMI 22.81 kg/m²     Wt Readings from Last 3 Encounters:   05/29/20 150 lb (68 kg)   09/18/19 128 lb (58.1 kg)   09/06/19 129 lb (58.5 kg)     BP Readings from Last 3 Encounters:   05/29/20 130/88   09/18/19 128/70   09/06/19 124/80       PHYSICAL EXAM  Constitutional: Oriented to person, place, and time. Appears well-developed and well-nourished. HENT:   Head: Normocephalic and atraumatic. Eyes: EOM are normal. Pupils are equal, round, and reactive to light. Neck: Normal range of motion. Neck supple. No JVD present. Cardiovascular: Normal rate , normal heart sounds and intact distal pulses. Pulmonary/Chest: Effort normal and breath sounds normal. No respiratory distress. No wheezes. No rales. Abdominal: Soft. Bowel sounds are normal. No distension. There is no tenderness. Musculoskeletal: Normal range of motion. No edema. Neurological: Alert and oriented to person, place, and time. No cranial nerve deficit. Coordination normal.   Skin: Skin is warm and dry. Psychiatric: Normal mood and affect.        No results found for: CKTOTAL, CKMB, CKMBINDEX    Lab Results   Component Value Date    WBC 5.5 07/31/2019    RBC 4.56 07/31/2019    HGB 14.0 07/31/2019    HCT 42.0 07/31/2019    MCV 92.0 07/31/2019    MCH 30.7 07/31/2019    MCHC 33.3 07/31/2019    RDW 12.9 07/31/2019     07/31/2019       Lab Results   Component Value Date     07/31/2019    K 3.9 07/31/2019     07/31/2019    CO2 25 07/31/2019    BUN 16 07/31/2019    CREATININE 0.89 07/31/2019    CALCIUM 9.40 07/31/2019    GLUCOSE 92 07/31/2019       No results found for: ALKPHOS, ALT, AST, PROT, BILITOT, BILIDIR, IBILI, LABALBU    No results found for: MG    No results found for: INR, PROTIME      No results found for: LABA1C    No results found for: TRIG, HDL, LDLCALC, LDLDIRECT, LABVLDL    Lab Results   Component Value Date    TSH 1.398 07/31/2019         Testing Reviewed:      I haveindividually reviewed the below cardiac tests    EKG:    ECHO:   Results for orders placed during the hospital encounter of 08/06/19   ECHO Complete 2D W Doppler W Color    Narrative Transthoracic Echocardiography Report (TTE)     Demographics      Patient Name  Юлия Dee Gender             Female                 M      MR #          [id number]      Race                                                  Ethnicity      Account #     [de-identified]      Room Number      Accession     [id number]      Date of Study      08/06/2019   Number      Date of Birth 2000     Referring          Genesis Chauhan DO                                Physician          Socorro Martin MD      Age           22 year(s)     Sonographer        LINDSAY Stapleton, ROD,                                                   RDMS, RVT                                   Interpreting       Socorro Martin MD                                Physician     Procedure    Type of Study      TTE procedure:ECHOCARDIOGRAM COMPLETE 2D W DOPPLER W COLOR. Procedure Date  Date: 08/06/2019 Start: 08:16 AM    Study Location: Echo Lab  Technical Quality: Adequate visualization    Indications:Syncope. Additional Medical History:chest pain, dizziness    Patient Status: Routine    Height: 67 inches Weight: 125 pounds BSA: 1.66 m^2 BMI: 19.58 kg/m^2    BP: 131/86 mmHg     Conclusions      Summary   Left ventricular size and systolic function is normal. Ejection fraction   was estimated at 55-60%. LV wall thickness is within normal limits and   there are no obvious wall motion abnormalities. Mild tricuspid regurgitation.       Signature      ----------------------------------------------------------------   Electronically signed by Socorro Martin MD (Interpreting   physician) on 08/06/2019 at 07:16 PM Syncope  24 weeks      Reviewed EKG, Echo, Stress test and Holter  Reviewed CTA chest  Advised to stay well hydrated  Placed on beta blocker since her sx are at higher HR  She reports feeling  Advised to avoid situations in which symptoms arise  Patient verbalized understanding and agreed to do so.   The patient is asked to make an attempt to improve diet and exercise patterns to aid in medical management of this problem. Advised more plant based nutrition/meditarrean diet   Advised patient to call office or seek immediate medical attention if there is any new onset of  any chest pain, sob, palpitations, lightheadedness, dizziness, orthopnea, PND or pedal edema. All medication side effects were discussed in details.     Thank youfor allowing me to participate in the care of this patient. Please do not hesitate to contact me for any further questions. Return in about 1 year (around 5/29/2021) for Regular follow up, Review testing.        Electronically signed by Jolie Robert MD [facility] [place]  5/29/2020 at 11:11 AM

## 2021-05-28 ENCOUNTER — OFFICE VISIT (OUTPATIENT)
Dept: CARDIOLOGY CLINIC | Age: 21
End: 2021-05-28
Payer: COMMERCIAL

## 2021-05-28 VITALS
SYSTOLIC BLOOD PRESSURE: 134 MMHG | DIASTOLIC BLOOD PRESSURE: 84 MMHG | WEIGHT: 132 LBS | HEART RATE: 113 BPM | BODY MASS INDEX: 20 KG/M2 | HEIGHT: 68 IN

## 2021-05-28 DIAGNOSIS — R42 DIZZINESS: ICD-10-CM

## 2021-05-28 DIAGNOSIS — R55 SYNCOPE, UNSPECIFIED SYNCOPE TYPE: Primary | ICD-10-CM

## 2021-05-28 DIAGNOSIS — R00.2 PALPITATIONS: ICD-10-CM

## 2021-05-28 PROCEDURE — 93000 ELECTROCARDIOGRAM COMPLETE: CPT | Performed by: INTERNAL MEDICINE

## 2021-05-28 PROCEDURE — 99214 OFFICE O/P EST MOD 30 MIN: CPT | Performed by: INTERNAL MEDICINE

## 2021-05-28 RX ORDER — NIFEDIPINE 90 MG/1
90 TABLET, EXTENDED RELEASE ORAL DAILY
COMMUNITY
End: 2021-06-02 | Stop reason: SDUPTHER

## 2021-05-28 RX ORDER — LABETALOL 300 MG/1
300 TABLET, FILM COATED ORAL EVERY 8 HOURS
COMMUNITY
End: 2021-05-28 | Stop reason: ALTCHOICE

## 2021-05-28 NOTE — PROGRESS NOTES
Pt here for 1 yr check up     Pt has not taken metoprolol since being put on lebatalol     Pt c/o feels tingly dizziness when standing at times, feels like going to pass out at times, loses vision did not notice until having daughter last year

## 2021-05-28 NOTE — PROGRESS NOTES
[address] [place] [address],[place] [address] [address]  [place] [address]  Dept: [phone]  Dept Fax: [phone]  Loc: [phone]    Visit Date: 5/28/2021    Ms. Cortney Tinajero is a 23 y.o. female  who presented for:  Chief Complaint   Patient presents with    Check-Up    Palpitations       HPI:   23 yo F is here for a follow up. Had preeclapsia. Denies any chest pain, sob, palpitations, lightheadedness, dizziness, orthopnea, PND or pedal edema. Has been on Procardia XL 90mg and Labetalol 300mg TID. Her BP is normal.          Current Outpatient Medications:     NIFEdipine (PROCARDIA XL) 90 MG extended release tablet, Take 90 mg by mouth daily, Disp: , Rfl:     Multiple Vitamins-Minerals (MULTIVITAMIN WOMEN PO), Take by mouth daily, Disp: , Rfl:     labetalol (NORMODYNE) 300 MG tablet, Take 300 mg by mouth every 8 hours, Disp: , Rfl:     Past Medical History  Magnolia Flores  has no past medical history on file. Social History  Magnolia Flores  reports that she has never smoked. She has never used smokeless tobacco.    Family History  Magnolia Flores family history includes Cancer in her maternal grandmother; Other in her mother. Past Surgical History   History reviewed. No pertinent surgical history. Subjective:     REVIEW OF SYSTEMS  Constitutional: denies sweats, chills and fever  HENT: denies  congestion, sinus pressure, sneezing and sore throat. Eyes: denies  pain, discharge, redness and itching. Respiratory: denies apnea, cough  Gastrointestinal: denies blood in stool, constipation, diarrhea   Endocrine: denies cold intolerance, heat intolerance, polydipsia. Genitourinary: denies dysuria, enuresis, flank pain and hematuria. Musculoskeletal: denies arthralgias, joint swelling and neck pain. Neurological: denies numbness and headaches. Psychiatric/Behavioral: denies agitation, confusion, decreased concentration and dysphoric mood    All others reviewed and are negative. Objective:     /84   Pulse 113   Ht 5' 8\" (1.727 m)   Wt 132 lb (59.9 kg)   BMI 20.07 kg/m²     Wt Readings from Last 3 Encounters:   05/28/21 132 lb (59.9 kg)   05/29/20 150 lb (68 kg)   09/18/19 128 lb (58.1 kg)     BP Readings from Last 3 Encounters:   05/28/21 134/84   05/29/20 130/88   09/18/19 128/70       PHYSICAL EXAM  Constitutional: Oriented to person, place, and time. Appears well-developed and well-nourished. HENT:   Head: Normocephalic and atraumatic. Eyes: EOM are normal. Pupils are equal, round, and reactive to light. Neck: Normal range of motion. Neck supple. No JVD present. Cardiovascular: Normal rate , normal heart sounds and intact distal pulses. Pulmonary/Chest: Effort normal and breath sounds normal. No respiratory distress. No wheezes. No rales. Abdominal: Soft. Bowel sounds are normal. No distension. There is no tenderness. Musculoskeletal: Normal range of motion. No edema. Neurological: Alert and oriented to person, place, and time. No cranial nerve deficit. Coordination normal.   Skin: Skin is warm and dry. Psychiatric: Normal mood and affect.        No results found for: CKTOTAL, CKMB, CKMBINDEX    Lab Results   Component Value Date    WBC 5.5 07/31/2019    RBC 4.56 07/31/2019    HGB 14.0 07/31/2019    HCT 42.0 07/31/2019    MCV 92.0 07/31/2019    MCH 30.7 07/31/2019    MCHC 33.3 07/31/2019    RDW 12.9 07/31/2019     07/31/2019       Lab Results   Component Value Date     07/31/2019    K 3.9 07/31/2019     07/31/2019    CO2 25 07/31/2019    BUN 16 07/31/2019    CREATININE 0.89 07/31/2019    CALCIUM 9.40 07/31/2019    GLUCOSE 92 07/31/2019       No results found for: ALKPHOS, ALT, AST, PROT, BILITOT, BILIDIR, IBILI, LABALBU    No results found for: MG    No results found for: INR, PROTIME      No results found for: LABA1C    No results found for: TRIG, HDL, LDLCALC, LDLDIRECT, LABVLDL    Lab Results   Component Value Date    TSH 1.398 07/31/2019         Testing Reviewed:      I haveindividually reviewed the below cardiac tests    EKG:    ECHO:   Results for orders placed during the hospital encounter of 08/06/19   ECHO Complete 2D W Doppler W Color    Narrative Transthoracic Echocardiography Report (TTE)     Demographics      Patient Name  Black Dawn Gender             Female                 VALERIE      MR #          [id number]      Race                                                  Ethnicity      Account #     [de-identified]      Room Number      Accession     [id number]      Date of Study      08/06/2019   Number      Date of Birth 2000     Referring          Opal Nix DO                                Physician          Jean Carlos Lomeli MD      Age           22 year(s)     Sonographer        LINDSAY Luna, RDCS,                                                   RDMS, RVT                                   Interpreting       Jean Carlos Lomeli MD                                Physician     Procedure    Type of Study      TTE procedure:ECHOCARDIOGRAM COMPLETE 2D W DOPPLER W COLOR. Procedure Date  Date: 08/06/2019 Start: 08:16 AM    Study Location: Echo Lab  Technical Quality: Adequate visualization    Indications:Syncope. Additional Medical History:chest pain, dizziness    Patient Status: Routine    Height: 67 inches Weight: 125 pounds BSA: 1.66 m^2 BMI: 19.58 kg/m^2    BP: 131/86 mmHg     Conclusions      Summary   Left ventricular size and systolic function is normal. Ejection fraction   was estimated at 55-60%. LV wall thickness is within normal limits and   there are no obvious wall motion abnormalities. Mild tricuspid regurgitation.       Signature      ----------------------------------------------------------------   Electronically signed by Jean Carlos Lomeli MD (Interpreting   physician) on 08/06/2019 at 07:16 PM   ----------------------------------------------------------------      Findings      Mitral Valve   The mitral valve structure is normal with normal leaflet separation. DOPPLER: The transmitral velocity was within the normal range with no   evidence for mitral stenosis. There was no evidence of mitral   regurgitation. Aortic Valve   The aortic valve leaflets were not well visualized. The aortic valve appears to be trileaflet with good leaflet separation. DOPPLER: Transaortic velocity was within the normal range with no evidence   of aortic stenosis. There was no evidence of aortic regurgitation. Tricuspid Valve   The tricuspid valve structure is normal with normal leaflet separation. DOPPLER: There is no evidence of tricuspid stenosis. Mild tricuspid regurgitation. Pulmonic Valve   The pulmonic valve leaflets appear normal thickness, and normal cuspal   separation. DOPPLER: The transpulmonic velocity was within the normal   range. No evidence for regurgitation. Left Atrium   Left atrial size is normal.      Left Ventricle   Left ventricular size and systolic function is normal. Ejection fraction   was estimated at 55-60%. LV wall thickness is within normal limits and   there are no obvious wall motion abnormalities. Right Atrium   Right atrial size was normal.      Right Ventricle   The right ventricular size appears normal with normal systolic function   and wall thickness. Pericardial Effusion   The pericardium appears normal with no evidence of a pericardial effusion. Pleural Effusion   No evidence of pleural effusion. Aorta / Great Vessels   -Aortic root dimension within normal limits. -IVC size is within normal limits with normal respiratory phasic changes.      M-Mode/2D Measurements & Calculations      LV Diastolic   LV Systolic Dimension:    AV Cusp Separation: 1.4 cmLA   Dimension: 4.1 2.8 cm                    Dimension: 2.8 cmAO Root   cm             LV Volume Diastolic: 84.2 Dimension: 2.3 cmLA Area: 14 cm^2   LV FS:31.7 %   ml   LV PW          LV Volume Systolic: 76.9   Diastolic: 0.8 ml   cm             LV EDV/LV EDV Index: 09.3 RV Diastolic Dimension: 1.1 cm   Septum         ml/45 m^2LV ESV/LV ESV   Diastolic: 0.7 Index: 08.5 ml/18 m^2     LA/Aorta: 1.22   cm             EF Calculated: 60.1 %     Ascending Aorta: 2.2 cm                                            LA volume/Index: 32.5 ml /20m^2     Doppler Measurements & Calculations      MV Peak E-Wave: 144 cm/s   AV Peak Velocity: 143  LVOT Peak Velocity: 124   MV Peak A-Wave: 60.5 cm/s  cm/s                   cm/s   MV E/A Ratio: 2.38         AV Peak Gradient: 8.18 LVOT Peak Gradient: 6   MV Peak Gradient: 8.29     mmHg                   mmHg   mmHg                                                     TV Peak E-Wave: 73.1   MV Deceleration Time: 183                         cm/s   msec                                              TV Peak A-Wave: 62.2                              IVRT: 49 msec          cm/s      MV E' Septal Velocity:                            TV Peak Gradient: 2.14   10.4 cm/s                  AV DVI (Vmax):0.87     mmHg   MV A' Septal Velocity: 6                          TR Velocity:228 cm/s   cm/s                                              TR Gradient:20.79 mmHg   MV E' Lateral Velocity: 19                        PV Peak Velocity: 83.2   cm/s                                              cm/s   MV A' Lateral Velocity:                           PV Peak Gradient: 2.77   9.7 cm/s                                          mmHg   E/E' septal: 13.85   E/E' lateral: 7.58                                                     KS ED Velocity: 157 cm/s     [email or web address]  [id number]       STRESS:    CATH:    Assessment/Plan       Diagnosis Orders   1. Syncope, unspecified syncope type  EKG 12 Lead    EKG 12 lead   2. Dizziness  EKG 12 Lead    EKG 12 lead   3.  Palpitations  EKG 12 Lead    EKG 12 lead       Hx Syncope  24 weeks      Reviewed EKG, Echo, Stress test and Holter  Reviewed CTA chest  Advised to stay well hydrated  Will d/c labetolol  And add metoprolol 25mg BID  Will repeat Echo  She reports feeling  Advised to avoid situations in which symptoms arise  Patient verbalized understanding and agreed to do so.   The patient is asked to make an attempt to improve diet and exercise patterns to aid in medical management of this problem. Advised more plant based nutrition/meditarrean diet   Advised patient to call office or seek immediate medical attention if there is any new onset of  any chest pain, sob, palpitations, lightheadedness, dizziness, orthopnea, PND or pedal edema. All medication side effects were discussed in details.     Thank youfor allowing me to participate in the care of this patient. Please do not hesitate to contact me for any further questions. No follow-ups on file.        Electronically signed by Bright Casillas MD [facility] [place]  5/28/2021 at 11:11 AM

## 2021-06-03 RX ORDER — NIFEDIPINE 90 MG/1
90 TABLET, EXTENDED RELEASE ORAL DAILY
Qty: 90 TABLET | Refills: 3 | Status: SHIPPED | OUTPATIENT
Start: 2021-06-03

## 2021-06-11 ENCOUNTER — HOSPITAL ENCOUNTER (OUTPATIENT)
Dept: NON INVASIVE DIAGNOSTICS | Age: 21
Discharge: HOME | End: 2021-06-11
Payer: COMMERCIAL

## 2021-06-11 DIAGNOSIS — R00.2 PALPITATIONS: ICD-10-CM

## 2021-06-11 DIAGNOSIS — R55 SYNCOPE, UNSPECIFIED SYNCOPE TYPE: ICD-10-CM

## 2021-06-11 DIAGNOSIS — R42 DIZZINESS: ICD-10-CM

## 2021-06-11 LAB
LV EF: 58 %
LVEF MODALITY: NORMAL

## 2021-06-11 PROCEDURE — 93306 TTE W/DOPPLER COMPLETE: CPT

## 2021-07-09 ENCOUNTER — OFFICE VISIT (OUTPATIENT)
Dept: CARDIOLOGY CLINIC | Age: 21
End: 2021-07-09
Payer: COMMERCIAL

## 2021-07-09 VITALS
HEIGHT: 68 IN | HEART RATE: 108 BPM | SYSTOLIC BLOOD PRESSURE: 128 MMHG | BODY MASS INDEX: 20.98 KG/M2 | DIASTOLIC BLOOD PRESSURE: 82 MMHG | WEIGHT: 138.4 LBS

## 2021-07-09 DIAGNOSIS — R00.0 SINUS TACHYCARDIA: Primary | ICD-10-CM

## 2021-07-09 PROCEDURE — 99214 OFFICE O/P EST MOD 30 MIN: CPT | Performed by: INTERNAL MEDICINE

## 2021-07-09 NOTE — PROGRESS NOTES
[address] [place] [address],[place] [address]  [place] [address]  Dept: [phone]  Dept Fax: [phone]  Loc: [phone]    Visit Date: 7/9/2021    Ms. Eden Diggs is a 19 y.o. female  who presented for:  Chief Complaint   Patient presents with    1 Month Follow-Up    Results       HPI:   21 yo F is here for a follow up. Had preeclapsia. Denies any chest pain, sob, palpitations, lightheadedness, dizziness, orthopnea, PND or pedal edema. Has been on Procardia XL 90mg and Labetalol 300mg TID. Her BP is normal.    On last visit, we removed labetalol. BP better controlled but still very tachycardiac. 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 She is unable too much physicial activity due to tachycardia. Current Outpatient Medications:     NIFEdipine (PROCARDIA XL) 90 MG extended release tablet, Take 1 tablet by mouth daily, Disp: 90 tablet, Rfl: 3    Multiple Vitamins-Minerals (MULTIVITAMIN WOMEN PO), Take by mouth daily, Disp: , Rfl:     metoprolol tartrate (LOPRESSOR) 25 MG tablet, Take 1 tablet by mouth 2 times daily, Disp: 180 tablet, Rfl: 1    Past Medical History  Yolis Yo  has no past medical history on file. Social History  Yolis Yo  reports that she has never smoked. She has never used smokeless tobacco.    Family History  Yolis Yo family history includes Cancer in her maternal grandmother; Other in her mother. Past Surgical History   History reviewed. No pertinent surgical history. Subjective:     REVIEW OF SYSTEMS  Constitutional: denies sweats, chills and fever  HENT: denies  congestion, sinus pressure, sneezing and sore throat. Eyes: denies  pain, discharge, redness and itching. Respiratory: denies apnea, cough  Gastrointestinal: denies blood in stool, constipation, diarrhea   Endocrine: denies cold intolerance, heat intolerance, polydipsia. Genitourinary: denies dysuria, enuresis, flank pain and hematuria.    Musculoskeletal: denies arthralgias, joint swelling and neck pain. Neurological: denies numbness and headaches. Psychiatric/Behavioral: denies agitation, confusion, decreased concentration and dysphoric mood    All others reviewed and are negative. Objective:     /82   Pulse 108   Ht 5' 8\" (1.727 m)   Wt 138 lb 6.4 oz (62.8 kg)   BMI 21.04 kg/m²     Wt Readings from Last 3 Encounters:   07/09/21 138 lb 6.4 oz (62.8 kg)   05/28/21 132 lb (59.9 kg)   05/29/20 150 lb (68 kg)     BP Readings from Last 3 Encounters:   07/09/21 128/82   05/28/21 134/84   05/29/20 130/88       PHYSICAL EXAM  Constitutional: Oriented to person, place, and time. Appears well-developed and well-nourished. HENT:   Head: Normocephalic and atraumatic. Eyes: EOM are normal. Pupils are equal, round, and reactive to light. Neck: Normal range of motion. Neck supple. No JVD present. Cardiovascular: Normal rate , normal heart sounds and intact distal pulses. Pulmonary/Chest: Effort normal and breath sounds normal. No respiratory distress. No wheezes. No rales. Abdominal: Soft. Bowel sounds are normal. No distension. There is no tenderness. Musculoskeletal: Normal range of motion. No edema. Neurological: Alert and oriented to person, place, and time. No cranial nerve deficit. Coordination normal.   Skin: Skin is warm and dry. Psychiatric: Normal mood and affect.        No results found for: CKTOTAL, CKMB, CKMBINDEX    Lab Results   Component Value Date    WBC 5.5 07/31/2019    RBC 4.56 07/31/2019    HGB 14.0 07/31/2019    HCT 42.0 07/31/2019    MCV 92.0 07/31/2019    MCH 30.7 07/31/2019    MCHC 33.3 07/31/2019    RDW 12.9 07/31/2019     07/31/2019       Lab Results   Component Value Date     07/31/2019    K 3.9 07/31/2019     07/31/2019    CO2 25 07/31/2019    BUN 16 07/31/2019    CREATININE 0.89 07/31/2019    CALCIUM 9.40 07/31/2019    GLUCOSE 92 07/31/2019       No results found for: ALKPHOS, ALT, AST, PROT, BILITOT, BILIDIR, IBILI, LABALBU    No results found for: MG    No results found for: INR, PROTIME      No results found for: LABA1C    No results found for: TRIG, HDL, LDLCALC, LDLDIRECT, LABVLDL    Lab Results   Component Value Date    TSH 1.398 07/31/2019         Testing Reviewed:      I haveindividually reviewed the below cardiac tests    EKG:    ECHO:   Results for orders placed during the hospital encounter of 08/06/19   ECHO Complete 2D W Doppler W Color    Narrative Transthoracic Echocardiography Report (TTE)     Demographics      Patient Name  Minus Furlough Gender             Female                 M      MR #          [id number]      Race                                                  Ethnicity      Account #     [de-identified]      Room Number      Accession     [id number]      Date of Study      08/06/2019   Number      Date of Birth 2000     Referring          Yael Levin DO                                Physician          Janae Franklin MD      Age           22 year(s)     Sonographer        LINDSAY Amaya, RDCS,                                                   RDMS, RVT                                   Interpreting       Janae Franklin MD                                Physician     Procedure    Type of Study      TTE procedure:ECHOCARDIOGRAM COMPLETE 2D W DOPPLER W COLOR. Procedure Date  Date: 08/06/2019 Start: 08:16 AM    Study Location: Echo Lab  Technical Quality: Adequate visualization    Indications:Syncope. Additional Medical History:chest pain, dizziness    Patient Status: Routine    Height: 67 inches Weight: 125 pounds BSA: 1.66 m^2 BMI: 19.58 kg/m^2    BP: 131/86 mmHg     Conclusions      Summary   Left ventricular size and systolic function is normal. Ejection fraction   was estimated at 55-60%. LV wall thickness is within normal limits and   there are no obvious wall motion abnormalities. Mild tricuspid regurgitation.       Signature ----------------------------------------------------------------   Electronically signed by Obed Brooks MD (Interpreting   physician) on 08/06/2019 at 07:16 PM   ----------------------------------------------------------------      Findings      Mitral Valve   The mitral valve structure is normal with normal leaflet separation. DOPPLER: The transmitral velocity was within the normal range with no   evidence for mitral stenosis. There was no evidence of mitral   regurgitation. Aortic Valve   The aortic valve leaflets were not well visualized. The aortic valve appears to be trileaflet with good leaflet separation. DOPPLER: Transaortic velocity was within the normal range with no evidence   of aortic stenosis. There was no evidence of aortic regurgitation. Tricuspid Valve   The tricuspid valve structure is normal with normal leaflet separation. DOPPLER: There is no evidence of tricuspid stenosis. Mild tricuspid regurgitation. Pulmonic Valve   The pulmonic valve leaflets appear normal thickness, and normal cuspal   separation. DOPPLER: The transpulmonic velocity was within the normal   range. No evidence for regurgitation. Left Atrium   Left atrial size is normal.      Left Ventricle   Left ventricular size and systolic function is normal. Ejection fraction   was estimated at 55-60%. LV wall thickness is within normal limits and   there are no obvious wall motion abnormalities. Right Atrium   Right atrial size was normal.      Right Ventricle   The right ventricular size appears normal with normal systolic function   and wall thickness. Pericardial Effusion   The pericardium appears normal with no evidence of a pericardial effusion. Pleural Effusion   No evidence of pleural effusion. Aorta / Great Vessels   -Aortic root dimension within normal limits. -IVC size is within normal limits with normal respiratory phasic changes.      M-Mode/2D Measurements & Calculations      LV Diastolic   LV Systolic Dimension:    AV Cusp Separation: 1.4 cmLA   Dimension: 4.1 2.8 cm                    Dimension: 2.8 cmAO Root   cm             LV Volume Diastolic: 93.3 Dimension: 2.3 cmLA Area: 14 cm^2   LV FS:31.7 %   ml   LV PW          LV Volume Systolic: 48.6   Diastolic: 0.8 ml   cm             LV EDV/LV EDV Index: 05.9 RV Diastolic Dimension: 1.1 cm   Septum         ml/45 m^2LV ESV/LV ESV   Diastolic: 0.7 Index: 60.9 ml/18 m^2     LA/Aorta: 1.22   cm             EF Calculated: 60.1 %     Ascending Aorta: 2.2 cm                                            LA volume/Index: 32.5 ml /20m^2     Doppler Measurements & Calculations      MV Peak E-Wave: 144 cm/s   AV Peak Velocity: 143  LVOT Peak Velocity: 124   MV Peak A-Wave: 60.5 cm/s  cm/s                   cm/s   MV E/A Ratio: 2.38         AV Peak Gradient: 8.18 LVOT Peak Gradient: 6   MV Peak Gradient: 8.29     mmHg                   mmHg   mmHg                                                     TV Peak E-Wave: 73.1   MV Deceleration Time: 183                         cm/s   msec                                              TV Peak A-Wave: 62.2                              IVRT: 49 msec          cm/s      MV E' Septal Velocity:                            TV Peak Gradient: 2.14   10.4 cm/s                  AV DVI (Vmax):0.87     mmHg   MV A' Septal Velocity: 6                          TR Velocity:228 cm/s   cm/s                                              TR Gradient:20.79 mmHg   MV E' Lateral Velocity: 19                        PV Peak Velocity: 83.2   cm/s                                              cm/s   MV A' Lateral Velocity:                           PV Peak Gradient: 2.77   9.7 cm/s                                          mmHg   E/E' septal: 13.85   E/E' lateral: 7.58                                                     TN ED Velocity: 157 cm/s [email or web address]       STRESS:    CATH:    Assessment/Plan       Diagnosis Orders   1. Sinus tachycardia         Persistent Sinus tachycardia  Hx Syncope  24 weeks      Reviewed EKG, Echo, Stress test and Holter  Reviewed CTA chest  Advised to stay well hydrated  Will d/c labetolol  And add metoprolol 25mg BID on last visit  Will increase to 50mg AM and 25mg PM  Will also refer to Dr. 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 Reviewed recent Echo  Advised to avoid situations in which symptoms arise  Patient verbalized understanding and agreed to do so.   The patient is asked to make an attempt to improve diet and exercise patterns to aid in medical management of this problem. Advised more plant based nutrition/meditarrean diet   Advised patient to call office or seek immediate medical attention if there is any new onset of  any chest pain, sob, palpitations, lightheadedness, dizziness, orthopnea, PND or pedal edema. All medication side effects were discussed in details.     Thank youfor allowing me to participate in the care of this patient. Please do not hesitate to contact me for any further questions. Return in about 6 months (around 1/9/2022), or if symptoms worsen or fail to improve, for Regular follow up, Review testing.        Electronically signed by Dolly Mauricio MD [facility]  7/9/2021 at 11:11 AM

## 2021-07-16 ENCOUNTER — OFFICE VISIT (OUTPATIENT)
Dept: FAMILY MEDICINE CLINIC | Age: 21
End: 2021-07-16
Payer: COMMERCIAL

## 2021-07-16 VITALS
WEIGHT: 136 LBS | HEART RATE: 88 BPM | DIASTOLIC BLOOD PRESSURE: 62 MMHG | RESPIRATION RATE: 16 BRPM | BODY MASS INDEX: 20.68 KG/M2 | SYSTOLIC BLOOD PRESSURE: 124 MMHG

## 2021-07-16 DIAGNOSIS — L72.3 SEBACEOUS CYST: Primary | ICD-10-CM

## 2021-07-16 PROCEDURE — 99213 OFFICE O/P EST LOW 20 MIN: CPT | Performed by: FAMILY MEDICINE

## 2021-07-16 SDOH — ECONOMIC STABILITY: FOOD INSECURITY: WITHIN THE PAST 12 MONTHS, THE FOOD YOU BOUGHT JUST DIDN'T LAST AND YOU DIDN'T HAVE MONEY TO GET MORE.: NEVER TRUE

## 2021-07-16 SDOH — ECONOMIC STABILITY: FOOD INSECURITY: WITHIN THE PAST 12 MONTHS, YOU WORRIED THAT YOUR FOOD WOULD RUN OUT BEFORE YOU GOT MONEY TO BUY MORE.: NEVER TRUE

## 2021-07-16 ASSESSMENT — SOCIAL DETERMINANTS OF HEALTH (SDOH): HOW HARD IS IT FOR YOU TO PAY FOR THE VERY BASICS LIKE FOOD, HOUSING, MEDICAL CARE, AND HEATING?: NOT HARD AT ALL

## 2021-07-16 ASSESSMENT — ENCOUNTER SYMPTOMS
GASTROINTESTINAL NEGATIVE: 1
RESPIRATORY NEGATIVE: 1
ROS SKIN COMMENTS: BUMP ON BACK OF NECK

## 2021-07-16 ASSESSMENT — PATIENT HEALTH QUESTIONNAIRE - PHQ9
SUM OF ALL RESPONSES TO PHQ9 QUESTIONS 1 & 2: 0
SUM OF ALL RESPONSES TO PHQ QUESTIONS 1-9: 0
1. LITTLE INTEREST OR PLEASURE IN DOING THINGS: 0
SUM OF ALL RESPONSES TO PHQ QUESTIONS 1-9: 0
SUM OF ALL RESPONSES TO PHQ QUESTIONS 1-9: 0
2. FEELING DOWN, DEPRESSED OR HOPELESS: 0

## 2021-07-16 NOTE — PROGRESS NOTES
Heena Wadsworth (:  2000) is a 19 y.o. female,Established patient, here for evaluation of the following chief complaint(s): Other (lump on upper back/neck that has become painful)             Subjective   SUBJECTIVE/OBJECTIVE:  HPI:    Chief Complaint   Patient presents with    Other     lump on upper back/neck that has become painful     Pt here for lump on neck for several years. Started out very small but now getting larger. Patient Active Problem List   Diagnosis    Chest pain    Dizziness    Pain in left arm     No past surgical history on file. Social History     Tobacco Use    Smoking status: Never Smoker    Smokeless tobacco: Never Used   Vaping Use    Vaping Use: Former   Substance Use Topics    Alcohol use: Not on file    Drug use: Not on file         Review of Systems   Constitutional: Negative. HENT: Negative. Respiratory: Negative. Cardiovascular: Negative. Gastrointestinal: Negative. Musculoskeletal: Negative. Skin:        Bump on back of neck   All other systems reviewed and are negative. Objective   Physical Exam  Vitals and nursing note reviewed. Constitutional:       General: She is not in acute distress. Appearance: Normal appearance. She is well-developed. HENT:      Head: Normocephalic and atraumatic. Right Ear: Tympanic membrane normal.      Left Ear: Tympanic membrane normal.   Eyes:      Conjunctiva/sclera: Conjunctivae normal.   Neck:     Cardiovascular:      Rate and Rhythm: Normal rate and regular rhythm. Heart sounds: Normal heart sounds. No murmur heard. Pulmonary:      Effort: Pulmonary effort is normal.      Breath sounds: Normal breath sounds. No wheezing, rhonchi or rales. Abdominal:      General: There is no distension. Musculoskeletal:      Cervical back: Neck supple. Skin:     General: Skin is warm and dry. Findings: No rash (on exposed surfaces).    Neurological:      General: No focal deficit present. Mental Status: She is alert. Psychiatric:         Attention and Perception: Attention normal.         Mood and Affect: Mood normal.         Speech: Speech normal.         Behavior: Behavior normal. Behavior is cooperative. Thought Content: Thought content normal.         Judgment: Judgment normal.        ASSESSMENT/PLAN:  1. Sebaceous cyst    -  Discussed surgical referral for removal, declines at this time. \"Just wanted to know what it was. \"    Return for RTO as needed. An electronic signature was used to authenticate this note.     --Page Paulson, DO

## 2021-08-10 ENCOUNTER — OFFICE VISIT (OUTPATIENT)
Dept: CARDIOLOGY CLINIC | Age: 21
End: 2021-08-10
Payer: COMMERCIAL

## 2021-08-10 VITALS
DIASTOLIC BLOOD PRESSURE: 88 MMHG | WEIGHT: 136.4 LBS | HEIGHT: 68 IN | HEART RATE: 135 BPM | SYSTOLIC BLOOD PRESSURE: 147 MMHG | BODY MASS INDEX: 20.67 KG/M2

## 2021-08-10 DIAGNOSIS — I10 HYPERTENSION, UNSPECIFIED TYPE: ICD-10-CM

## 2021-08-10 DIAGNOSIS — R00.0 SINUS TACHYCARDIA: Primary | ICD-10-CM

## 2021-08-10 PROCEDURE — 93000 ELECTROCARDIOGRAM COMPLETE: CPT | Performed by: INTERNAL MEDICINE

## 2021-08-10 PROCEDURE — 99204 OFFICE O/P NEW MOD 45 MIN: CPT | Performed by: INTERNAL MEDICINE

## 2021-08-10 NOTE — PROGRESS NOTES
EKG obtained    Ref Nallu  Sinus Tach- per Apple Watch-  141  Patient is currently breastfeeding, she consumes no caffeine    Patient admits to    [x] Chest pain- intermitten  [x] SOB w/ exertion  [x] Palpitations  [x] Heart racing/ skipping beats  [x] Light headed/ dizziness  [x] Passing out      Patient denies recent ED admissions

## 2021-08-10 NOTE — PROGRESS NOTES
[facility] ()  [id number] [place] [address]  Dept: [phone]    CARDIAC ELECTROPHYSIOLOGY: CONSULTATION NOTE  PATIENT DEMOGRAPHICS:  Date:   8/10/2021  Patient name:              Ricardo Horvath  YOB: 2000  Sex: female   MRN:   [id number]    PRIMARY CARE PHYSICIAN:   Ramona Augustine DO    REFERRING PROVIDER:  Mike Hahn MD    REASON FOR CONSULTATION:  Sinus tachycardia. 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 She was evaluated by Dr. Valeria Wong. Her echocardiogram was normal.  Holter monitor showed persistent sinus tachycardia. The patient is known to have hypertension for a long time on Procardia. She developed preeclampsia during her pregnancy in 2019 and labetalol was added. The Lipitor was discontinued recently and metoprolol was started for blood pressure control is sinus tachycardia. Due to persistent sinus tachycardia she was referred here for further evaluation. She does complain of intermittent palpitations especially with physical activities. Denies chest pain, shortness of breath, syncope or lower extremity swelling. She is otherwise fairly active lady and works as a . Medical hx: hx of preeclampsia (2019) on Procardia and Labetalol-recently discontinued), persistent tachycardia (2019) on metoprolol, hx of exertional syncope (2019). REVIEW OF SYSTEMS:    Constitutional: Negative for chills and fever  HENT: Negative for congestion, sinus pressure, sneezing and sore throat. Eyes: Negative for pain, discharge, redness and itching. Respiratory: Negative for apnea, cough  Gastrointestinal: Negative for blood in stool, constipation, diarrhea   Endocrine: Negative for cold intolerance, heat intolerance, polydipsia.   Genitourinary: Negative for dysuria, enuresis, flank pain and hematuria. Musculoskeletal: Negative for arthralgias, joint swelling and neck pain. Neurological: Negative for numbness and headaches. Psychiatric/Behavioral: Negative for agitation, confusion, decreased concentration and dysphoric mood. PAST MEDICAL HISTORY:  Medical hx: hx of preeclampsia (2019) on Procardia and Labetalol-recently discontinued), persistent tachycardia (2019) on metoprolol, hx of exertional syncope (2019). PSH:    FAMILY HISTORY:  Nothing significant. Family History   Problem Relation Age of Onset    Cancer Maternal Grandmother     Other Mother         autoimmune        SOCIAL HISTORY:  Single. She has 1 baby. She is currently breast-feeding her baby. Denies smoking or drinking alcohol or using recreational drugs. Social History     Socioeconomic History    Marital status: Single     Spouse name: Not on file    Number of children: Not on file    Years of education: Not on file    Highest education level: Not on file   Occupational History    Not on file   Tobacco Use    Smoking status: Never Smoker    Smokeless tobacco: Never Used   Vaping Use    Vaping Use: Former   Substance and Sexual Activity    Alcohol use: Not on file    Drug use: Not on file    Sexual activity: Not on file   Other Topics Concern    Not on file   Social History Narrative    Not on file     Social Determinants of Health     Financial Resource Strain: Low Risk     Difficulty of Paying Living Expenses: Not hard at all   Food Insecurity: No Food Insecurity    Worried About [address] in the Last Year: Never true    [address] [facility] in the Last Year: Never true   Transportation Needs:     Lack of Transportation (Medical):      Lack of Transportation (Non-Medical):    Physical Activity:     Days of Exercise per Week:     Minutes of Exercise per Session:    Stress:     Feeling of Stress :    Social Connections:     Frequency of Communication with Friends and Family:     Frequency of Social Gatherings with Friends and Family:     Attends [employer] Services:     Active Member of Clubs or Organizations:     Attends Club or Organization Meetings:     Marital Status:    Intimate Partner Violence:     Fear of Current or Ex-Partner:     Emotionally Abused:     Physically Abused:     Sexually Abused: ALLERGY HISTORY:  Allergies   Allergen Reactions    Penicillins Hives    Seasonal         MEDICATIONS:  Current Outpatient Medications   Medication Sig Dispense Refill    NIFEdipine (PROCARDIA XL) 90 MG extended release tablet Take 1 tablet by mouth daily 90 tablet 3    Multiple Vitamins-Minerals (MULTIVITAMIN WOMEN PO) Take by mouth daily      metoprolol tartrate (LOPRESSOR) 25 MG tablet Take 1 tablet by mouth 2 times daily 180 tablet 1     No current facility-administered medications for this visit. PHYSICAL EXAM:  Vitals:    08/10/21 0947   BP: (!) 147/88   Pulse: 135     Body mass index is 20.74 kg/m². GENERAL: Alert and oriented. No distress. EYES: No pallor or icterus. ENT: No cyanosis. No thyromegaly or cervical LAP. VESSELS: No jugular venous distension or carotid bruits. HEART: Normal S1/S2. No murmur, rub or gallop. LUNGS: Clear to auscultation. ABDOMEN: Soft and non-tender. EXTREMITIES: No lower extremity edema. Feet are warm. NEUROLOGICAL: Grossly normal.     LABORATORY DATA AND DIAGNOSTIC DATA:  I have personally reviewed and interpreted the results of the following diagnostic testing    Lab Results   Component Value Date    WBC 5.5 07/31/2019    HGB 14.0 07/31/2019    HCT 42.0 07/31/2019     07/31/2019     07/31/2019    K 3.9 07/31/2019     07/31/2019    CREATININE 0.89 07/31/2019    BUN 16 07/31/2019    CO2 25 07/31/2019    TSH 1.398 07/31/2019   Echocardiogram 5/28/2021: LVEF 55 to 60%. Normal left lower wall thickness. Moderate expiratory sedation.   ECG sinus tachycardia and incomplete right bundle branch block.  Holter monitor 8/19/2019: Heart rate varying between  7 bpm with an average heart rate of 79 bpm. No atrial/ventricular ectopy. IMPRESSION:  · Persistent sinus tachycardia. · Hypertension on Procardia. · History of exertional syncope. Normal echo. No recurrences. ASSESSMENT AND RECOMMENDATIONS:  The patient has symptomatic persistent sinus tachycardia and uncontrolled hypertension. Apparently she has been evaluated for secondary causes (no records). In view of her persistent sinus tachycardia and uncontrolled hypertension is prudent to rule out pheochromocytoma. We will request serum catecholamines and 24-hour urinary catecholamines and metanephrines. Following that we will also request a 48-hour Holter monitor and a tilt study. I will increase her metoprolol to 50 mg twice daily. Further recommendation to follow. Plan discussed with patient. Her questions answered. Thank you for allowing me to participate in the care of your patient. Please call me if you have any questions. **This report has been created using voice recognition software. It may contain minor errors which are inherent in voice recognition technology. **       Suleman Reina MD, [facility], [facility], [facility] [place] on 8/10/2021 at 10:18 AM

## 2021-08-14 ENCOUNTER — HOSPITAL ENCOUNTER (OUTPATIENT)
Age: 21
Discharge: HOME | End: 2021-08-14
Payer: COMMERCIAL

## 2021-08-14 DIAGNOSIS — I10 HYPERTENSION, UNSPECIFIED TYPE: ICD-10-CM

## 2021-08-14 DIAGNOSIS — R00.0 SINUS TACHYCARDIA: ICD-10-CM

## 2021-08-14 PROCEDURE — 83835 ASSAY OF METANEPHRINES: CPT

## 2021-08-14 PROCEDURE — 82384 ASSAY THREE CATECHOLAMINES: CPT

## 2021-08-14 PROCEDURE — 36415 COLL VENOUS BLD VENIPUNCTURE: CPT

## 2021-08-16 DIAGNOSIS — R00.0 SINUS TACHYCARDIA: ICD-10-CM

## 2021-08-16 DIAGNOSIS — I10 HYPERTENSION, UNSPECIFIED TYPE: ICD-10-CM

## 2021-08-16 PROCEDURE — 81050 URINALYSIS VOLUME MEASURE: CPT

## 2021-08-19 LAB — METANEPHRINES PLASMA: NORMAL

## 2021-08-20 ENCOUNTER — HOSPITAL ENCOUNTER (OUTPATIENT)
Dept: NON INVASIVE DIAGNOSTICS | Age: 21
Discharge: HOME | End: 2021-08-20
Payer: COMMERCIAL

## 2021-08-20 DIAGNOSIS — R00.0 SINUS TACHYCARDIA: ICD-10-CM

## 2021-08-20 DIAGNOSIS — I10 HYPERTENSION, UNSPECIFIED TYPE: ICD-10-CM

## 2021-08-20 LAB — METANEPHRINES TOTAL URINE: NORMAL

## 2021-08-20 PROCEDURE — 93225 XTRNL ECG REC<48 HRS REC: CPT

## 2021-08-20 PROCEDURE — 93226 XTRNL ECG REC<48 HR SCAN A/R: CPT

## 2021-08-21 LAB — CATECHOLAMINES TOTAL 24 HOUR URINE: NORMAL

## 2021-08-31 LAB
ACQUISITION DURATION: NORMAL S
AVERAGE HEART RATE: 95 BPM
HOOKUP DATE: NORMAL
HOOKUP TIME: NORMAL
MAX HEART RATE TIME/DATE: NORMAL
MAX HEART RATE: 140 BPM
MIN HEART RATE TIME/DATE: NORMAL
MIN HEART RATE: 60 BPM
NUMBER OF QRS COMPLEXES: NORMAL
NUMBER OF SUPRAVENTRICULAR COUPLETS: 0
NUMBER OF SUPRAVENTRICULAR ECTOPICS: 0
NUMBER OF SUPRAVENTRICULAR ISOLATED BEATS: 0
NUMBER OF VENTRICULAR BIGEMINAL CYCLES: 0
NUMBER OF VENTRICULAR COUPLETS: 0
NUMBER OF VENTRICULAR ECTOPICS: 0

## 2021-09-10 ENCOUNTER — HOSPITAL ENCOUNTER (OUTPATIENT)
Dept: NON INVASIVE DIAGNOSTICS | Age: 21
Discharge: HOME | End: 2021-09-10
Payer: COMMERCIAL

## 2021-09-10 VITALS — BODY MASS INDEX: 20.46 KG/M2 | HEIGHT: 68 IN | WEIGHT: 135 LBS

## 2021-09-10 DIAGNOSIS — R00.0 SINUS TACHYCARDIA: ICD-10-CM

## 2021-09-10 DIAGNOSIS — I10 HYPERTENSION, UNSPECIFIED TYPE: ICD-10-CM

## 2021-09-10 PROCEDURE — 93660 TILT TABLE EVALUATION: CPT | Performed by: INTERNAL MEDICINE

## 2021-09-10 NOTE — PROCEDURES
[facility]  Dept: [phone]    CARDIAC ELECTROPHYSIOLOGY: PROCEDURE NOTE  PATIENT DEMOGRAPHICS:  Date:   9/10/2021  Patient name:              Maria Esther Kelly  YOB: 2000  Sex: female   MRN:   [id number]    PRIMARY CARE PHYSICIAN:  Felipe Robin DO    CARDIOLOGIST:  Carlos Disla MD    PROCEDURE:  Head-up tilt table study. INDICATION  Sinus tachycardia. PROCEDURE:    The risks and benefits of tilt study discussed with the patient and informed consent obtained. The patient was gently secured to the tilt table. The patient was then hooked up to continuous three-channel heart rate monitor, continuous pulse oximeter and arm cuff blood pressure device. Hemodynamic parameters were obtained at baseline supine position, in a quite environment, for a minimum of 10 minutes. Continuous recording was performed through all phases of the test. The table was then tilted to 70 degrees for a maximum period of 30 minutes or until symptoms warranted termination of the test. Nitroglycerine 0.4 mg was administered sublingually hemodynamics monitored for additional 10 minutes or until symptoms warranted termination of the test.    RESULTS:  1. At baseline the rhythm was sinus, rate 80-92 beats per minute and blood pressure was 115-128/70-77 mm Hg. 2.  Significant change in heart rate but not in blood pressure within few minutes of head tilt and following administration of nitroglycerine (see below). 3.  No significant changes noted following administration of nitroglycerine. The patient's heart rate increased from 90 bpm from resting supine position to 132 bpm within 3 minutes of head tilt and blood pressure dropped from 124/80 mmHg to 115/72 mmHg. The patient's heart rate remained elevated in the range of 115 to 140 bpm throughout the tilt study.  Following administration of nitroglycerin her heart rate increased to a maximum of 160 bpm with a drop of blood pressure from 127/70 mmHg 111/60 mmHg. Patient did complain of a racing heart throughout the tilt study. At the end of the study the table was lowered to horizontal position and patient monitored for another 15 minutes. COMPLICATIONS:   None     CONCLUSION:  Findings consistent with a diagnosis of postural orthostatic tachycardia syndrome. Negative for neurocardiogenic syncope.       Electronically signed by Kris Treadwell MD, Cathi Aceves on 9/10/2021 at 5:01 PM ;

## 2021-09-15 ENCOUNTER — OFFICE VISIT (OUTPATIENT)
Dept: CARDIOLOGY CLINIC | Age: 21
End: 2021-09-15
Payer: COMMERCIAL

## 2021-09-15 VITALS — DIASTOLIC BLOOD PRESSURE: 91 MMHG | SYSTOLIC BLOOD PRESSURE: 147 MMHG | HEART RATE: 119 BPM

## 2021-09-15 DIAGNOSIS — R00.0 SINUS TACHYCARDIA: Primary | ICD-10-CM

## 2021-09-15 PROCEDURE — 93000 ELECTROCARDIOGRAM COMPLETE: CPT | Performed by: INTERNAL MEDICINE

## 2021-09-15 PROCEDURE — 99214 OFFICE O/P EST MOD 30 MIN: CPT | Performed by: INTERNAL MEDICINE

## 2021-09-15 PROCEDURE — G8420 CALC BMI NORM PARAMETERS: HCPCS | Performed by: INTERNAL MEDICINE

## 2021-09-15 PROCEDURE — G8427 DOCREV CUR MEDS BY ELIG CLIN: HCPCS | Performed by: INTERNAL MEDICINE

## 2021-09-15 PROCEDURE — 1036F TOBACCO NON-USER: CPT | Performed by: INTERNAL MEDICINE

## 2021-09-15 NOTE — PROGRESS NOTES
[facility] ()  [address] [place] [address]  Dept: [phone]    CARDIAC ELECTROPHYSIOLOGY: CONSULTATION NOTE  PATIENT DEMOGRAPHICS:  Date:   9/15/2021  Patient name:              Brandy Og  YOB: 2000  Sex: female   MRN:   [id number]    PRIMARY CARE PHYSICIAN:   Shira Mcguire DO    REFERRING PROVIDER:  Addie Duke MD    REASON FOR CONSULTATION:  Sinus tachycardia. HISTORY OF PRESENT ILLNESS:  23/F is here to follow up on the results of tilt table study. She has history of palpitation and noted to have persistent sinus tachycardia since 2019. Otherwise negative evaluation. Continues to have intermittent palpitation. Her heart rate continues to be in the range of 120s 130 bpm especially while active. Denies chest pain, shortness of breath or syncope. Medical hx: POTS symptomatic since 2019 (tilt 9/10/2021) on metoprolol,  hx of preeclampsia (2019) on Procardia and Labetalol-recently discontinued), hx of exertional syncope (2019). She is otherwise fairly active lady and works as a . Medical hx:  REVIEW OF SYSTEMS:    Constitutional: Negative for chills and fever  HENT: Negative for congestion, sinus pressure, sneezing and sore throat. Eyes: Negative for pain, discharge, redness and itching. Respiratory: Negative for apnea, cough  Gastrointestinal: Negative for blood in stool, constipation, diarrhea   Endocrine: Negative for cold intolerance, heat intolerance, polydipsia. Genitourinary: Negative for dysuria, enuresis, flank pain and hematuria. Musculoskeletal: Negative for arthralgias, joint swelling and neck pain. Neurological: Negative for numbness and headaches. Psychiatric/Behavioral: Negative for agitation, confusion, decreased concentration and dysphoric mood.       PAST MEDICAL HISTORY:  Medical hx: hx of preeclampsia (2019) on Procardia and Labetalol-recently discontinued), persistent tachycardia (2019) on metoprolol, hx of exertional syncope (2019). PSH:    FAMILY HISTORY:  Nothing significant. Family History   Problem Relation Age of Onset    Cancer Maternal Grandmother     Other Mother         autoimmune        SOCIAL HISTORY:  Single. She has 1 baby. She is currently breast-feeding her baby. Denies smoking or drinking alcohol or using recreational drugs. Social History     Socioeconomic History    Marital status: Single     Spouse name: Not on file    Number of children: Not on file    Years of education: Not on file    Highest education level: Not on file   Occupational History    Not on file   Tobacco Use    Smoking status: Never Smoker    Smokeless tobacco: Never Used   Vaping Use    Vaping Use: Former   Substance and Sexual Activity    Alcohol use: Not on file    Drug use: Not on file    Sexual activity: Not on file   Other Topics Concern    Not on file   Social History Narrative    Not on file     Social Determinants of Health     Financial Resource Strain: Low Risk     Difficulty of Paying Living Expenses: Not hard at all   Food Insecurity: No Food Insecurity    Worried About [address] [employer] in the Last Year: Never true    [address] [employer] in the Last Year: Never true   Transportation Needs:     Lack of Transportation (Medical):  Lack of Transportation (Non-Medical):    Physical Activity:     Days of Exercise per Week:     Minutes of Exercise per Session:    Stress:     Feeling of Stress :    Social Connections:     Frequency of Communication with Friends and Family:     Frequency of Social Gatherings with Friends and Family:     Attends Sabianism Services:     Active Member of Clubs or Organizations:     Attends Club or Organization Meetings:     Marital Status:    Intimate Partner Violence:     Fear of Current or Ex-Partner:     Emotionally Abused:     Physically Abused:     Sexually Abused:          ALLERGY HISTORY:  Allergies Allergen Reactions    Penicillins Hives    Seasonal         MEDICATIONS:  Current Outpatient Medications   Medication Sig Dispense Refill    metoprolol tartrate (LOPRESSOR) 25 MG tablet Take 2 tablets by mouth 2 times daily 180 tablet 1    NIFEdipine (PROCARDIA XL) 90 MG extended release tablet Take 1 tablet by mouth daily 90 tablet 3    Multiple Vitamins-Minerals (MULTIVITAMIN WOMEN PO) Take by mouth daily       No current facility-administered medications for this visit. PHYSICAL EXAM:  Vitals:    09/15/21 1141   BP: (!) 147/91   Pulse: 119     GENERAL: Alert and oriented. No distress. EYES: No pallor or icterus. ENT: No cyanosis. No thyromegaly or cervical LAP. VESSELS: No jugular venous distension or carotid bruits. HEART: Normal S1/S2. No murmur, rub or gallop. LUNGS: Clear to auscultation. ABDOMEN: Soft and non-tender. EXTREMITIES: No lower extremity edema. Feet are warm. NEUROLOGICAL: Grossly normal.     LABORATORY DATA AND DIAGNOSTIC DATA:  I have personally reviewed and interpreted the results of the following diagnostic testing    Lab Results   Component Value Date    WBC 5.5 07/31/2019    HGB 14.0 07/31/2019    HCT 42.0 07/31/2019     07/31/2019     07/31/2019    K 3.9 07/31/2019     07/31/2019    CREATININE 0.89 07/31/2019    BUN 16 07/31/2019    CO2 25 07/31/2019    TSH 1.398 07/31/2019   Echocardiogram 5/28/2021: LVEF 55 to 60%. Normal left lower wall thickness. Moderate expiratory sedation. ECG sinus tachycardia and incomplete right bundle branch block. Holter monitor 8/19/2019: Heart rate varying between  7 bpm with an average heart rate of 79 bpm. No atrial/ventricular ectopy. Urinary and serum metanephrines/catecholamines: Within normal limits. IMPRESSION:  · POTS, on metoprolol. · Hypertension on Procardia. · History of exertional syncope. Normal echo. No recurrences.        ASSESSMENT AND RECOMMENDATIONS:  The patient continues to breast-feed her baby. This time we don't have much options about her antihypertensive therapy. Continue Procardia at current doses, lifestyle modification and low-salt diet discussed. Increase metoprolol tartrate to 75 mg twice daily. If she fails metoprolol therapy will consider ivabradine. Plan with patient. Questions answered. Follow-up in 3 months. Thank you for allowing me to participate in the care of your patient. Please call me if you have any questions. **This report has been created using voice recognition software. It may contain minor errors which are inherent in voice recognition technology. **       Gaurav Mosqueda MD, MRCP, [facility] [place] [facility] on 9/15/2021 at 10:30 AM

## 2021-09-15 NOTE — PROGRESS NOTES
EKG obtained    Patient admits to    [x] SOB with Exertion  [x] Heart racing/ skipping beats  [x] Light headed/ dizziness      Patient admits to history of   [x] Event/ holter monitor 48 hr holter  [x] Tilt    Patient denies recent ED admissions  Patient denies; chest pain, sob, , heart racing.

## 2021-12-13 NOTE — PROGRESS NOTES
[facility] ([facility]  [id number] [place] [id number]  Dept: [phone]    CARDIAC ELECTROPHYSIOLOGY: CONSULTATION NOTE  PATIENT DEMOGRAPHICS:  Date:   12/15/2021  Patient name:              Velma Howell  YOB: 2000  Sex: female   MRN:   [id number]    PRIMARY CARE PHYSICIAN:   Sandy Bone DO    REFERRING PROVIDER:  [place] MD    REASON FOR CONSULTATION:  Inappropriate sinus tachycardia/POTS. HISTORY OF PRESENT ILLNESS:  23/F with POTS/IST and hypertension is here for a follow up visit. She continues to have intermittent palpitation. Her blood pressure continues to be uncontrolled (average 140/90 mmHg). Denies chest pain, shortness of breath. Very athletic and active otherwise. Medical hx: POTS symptomatic since 2019 (tilt 9/10/2021) on metoprolol,  hx of preeclampsia (2019), HTN on Procardia, hx of exertional syncope (2019). Medical hx:  REVIEW OF SYSTEMS:    Constitutional: Negative for chills and fever  HENT: Negative for congestion, sinus pressure, sneezing and sore throat. Eyes: Negative for pain, discharge, redness and itching. Respiratory: Negative for apnea, cough  Gastrointestinal: Negative for blood in stool, constipation, diarrhea   Endocrine: Negative for cold intolerance, heat intolerance, polydipsia. Genitourinary: Negative for dysuria, enuresis, flank pain and hematuria. Musculoskeletal: Negative for arthralgias, joint swelling and neck pain. Neurological: Negative for numbness and headaches. Psychiatric/Behavioral: Negative for agitation, confusion, decreased concentration and dysphoric mood. PAST MEDICAL HISTORY:  Medical hx: hx of preeclampsia (2019) on Procardia and Labetalol-recently discontinued), persistent tachycardia (2019) on metoprolol, hx of exertional syncope (2019). PSH:    FAMILY HISTORY:  Nothing significant.   Family History   Problem Relation Age of Onset    Cancer Maternal Grandmother     Other Mother         autoimmune        SOCIAL HISTORY:  Single. She has 1 baby. She is currently breast-feeding her baby. Denies smoking or drinking alcohol or using recreational drugs. Social History     Socioeconomic History    Marital status: Single     Spouse name: Not on file    Number of children: Not on file    Years of education: Not on file    Highest education level: Not on file   Occupational History    Not on file   Tobacco Use    Smoking status: Never Smoker    Smokeless tobacco: Never Used   Vaping Use    Vaping Use: Former   Substance and Sexual Activity    Alcohol use: Not on file    Drug use: Not on file    Sexual activity: Not on file   Other Topics Concern    Not on file   Social History Narrative    Not on file     Social Determinants of Health     Financial Resource Strain: Low Risk     Difficulty of Paying Living Expenses: Not hard at all   Food Insecurity: No Food Insecurity    Worried About [address] [employer] in the Last Year: Never true    [address] [facility] [employer] in the Last Year: Never true   Transportation Needs:     Lack of Transportation (Medical): Not on file    Lack of Transportation (Non-Medical):  Not on file   Physical Activity:     Days of Exercise per Week: Not on file    Minutes of Exercise per Session: Not on file   Stress:     Feeling of Stress : Not on file   Social Connections:     Frequency of Communication with Friends and Family: Not on file    Frequency of Social Gatherings with Friends and Family: Not on file    Attends Restorationist Services: Not on file    Active Member of Clubs or Organizations: Not on file    Attends Club or Organization Meetings: Not on file    Marital Status: Not on file   Intimate Partner Violence:     Fear of Current or Ex-Partner: Not on file    Emotionally Abused: Not on file    Physically Abused: Not on file    Sexually Abused: Not on file   Housing Stability:     Unable to Pay for Housing in the Last Year: Not on file    Number of Places Lived in the Last Year: Not on file    Unstable Housing in the Last Year: Not on file        ALLERGY HISTORY:  Allergies   Allergen Reactions    Penicillins Hives    Seasonal         MEDICATIONS:  Current Outpatient Medications   Medication Sig Dispense Refill    metoprolol tartrate (LOPRESSOR) 25 MG tablet Take 3 tablets by mouth 2 times daily 180 tablet 1    NIFEdipine (PROCARDIA XL) 90 MG extended release tablet Take 1 tablet by mouth daily 90 tablet 3    Multiple Vitamins-Minerals (MULTIVITAMIN WOMEN PO) Take by mouth daily       No current facility-administered medications for this visit. PHYSICAL EXAM:  Vitals:    12/15/21 1053   BP: (!) 140/87   Pulse: 109     Body mass index is 19.95 kg/m². GENERAL: Alert and oriented. No distress. EYES: No pallor or icterus. ENT: No cyanosis. No thyromegaly or cervical LAP. VESSELS: No jugular venous distension or carotid bruits. HEART: Normal S1/S2. No murmur, rub or gallop. LUNGS: Clear to auscultation. ABDOMEN: Soft and non-tender. EXTREMITIES: No lower extremity edema. Feet are warm. NEUROLOGICAL: Grossly normal.     LABORATORY DATA AND DIAGNOSTIC DATA:  I have personally reviewed and interpreted the results of the following diagnostic testing    Lab Results   Component Value Date    WBC 5.5 07/31/2019    HGB 14.0 07/31/2019    HCT 42.0 07/31/2019     07/31/2019     07/31/2019    K 3.9 07/31/2019     07/31/2019    CREATININE 0.89 07/31/2019    BUN 16 07/31/2019    CO2 25 07/31/2019    TSH 1.398 07/31/2019   Echocardiogram 5/28/2021: LVEF 55 to 60%. Normal left lower wall thickness. Moderate expiratory sedation. ECG sinus tachycardia and incomplete right bundle branch block. Holter monitor 8/19/2019: Heart rate varying between  bpm with an average heart rate of 79 bpm. No atrial/ventricular ectopy.   Urinary and serum metanephrines/catecholamines: Within normal limits. IMPRESSION:  · POTS/IST, on metoprolol. · Hypertension, uncontrolled on procardia. · History of exertional syncope. Normal echo. No recurrences. · Nursing baby. ASSESSMENT AND RECOMMENDATIONS:  The patient continues to have intermittent palpitation. Her average heart rate continues to be more than 100. Blood pressure uncontrolled. She is planning to wean her baby from breast-feeding. We will start her on losartan 25 mg daily and gradually increase the dose and wean her off Procardia. Increase metoprolol tartrate to 50 mg twice daily. Procardia is partly responsible for her tachycardia. Patient was encouraged to keep a log of the heart rate and the blood pressure and call us every 2 to 3 weeks. Low-salt diet, regular physical exercises and adherence to medication discussed. Goal blood pressure less than 120/80 mmHg. Follow-up in 6 months. Thank you for allowing me to participate in the care of your patient. Please call me if you have any questions. **This report has been created using voice recognition software. It may contain minor errors which are inherent in voice recognition technology. **       Fina Solis MD, MRCP, [facility], [facility] on 12/15/2021 at 11:18 AM

## 2021-12-15 ENCOUNTER — OFFICE VISIT (OUTPATIENT)
Dept: CARDIOLOGY CLINIC | Age: 21
End: 2021-12-15
Payer: COMMERCIAL

## 2021-12-15 VITALS
DIASTOLIC BLOOD PRESSURE: 83 MMHG | SYSTOLIC BLOOD PRESSURE: 138 MMHG | HEIGHT: 68 IN | WEIGHT: 131.2 LBS | BODY MASS INDEX: 19.88 KG/M2 | HEART RATE: 106 BPM

## 2021-12-15 DIAGNOSIS — R42 DIZZINESS: ICD-10-CM

## 2021-12-15 DIAGNOSIS — R00.0 RACING HEART BEAT: ICD-10-CM

## 2021-12-15 DIAGNOSIS — R06.02 SOB (SHORTNESS OF BREATH) ON EXERTION: Primary | ICD-10-CM

## 2021-12-15 PROCEDURE — 1036F TOBACCO NON-USER: CPT | Performed by: INTERNAL MEDICINE

## 2021-12-15 PROCEDURE — G8420 CALC BMI NORM PARAMETERS: HCPCS | Performed by: INTERNAL MEDICINE

## 2021-12-15 PROCEDURE — G8484 FLU IMMUNIZE NO ADMIN: HCPCS | Performed by: INTERNAL MEDICINE

## 2021-12-15 PROCEDURE — G8427 DOCREV CUR MEDS BY ELIG CLIN: HCPCS | Performed by: INTERNAL MEDICINE

## 2021-12-15 PROCEDURE — 93000 ELECTROCARDIOGRAM COMPLETE: CPT | Performed by: INTERNAL MEDICINE

## 2021-12-15 PROCEDURE — 99214 OFFICE O/P EST MOD 30 MIN: CPT | Performed by: INTERNAL MEDICINE

## 2021-12-15 RX ORDER — LOSARTAN POTASSIUM 25 MG/1
25 TABLET ORAL DAILY
Qty: 90 TABLET | Refills: 1 | Status: SHIPPED | OUTPATIENT
Start: 2021-12-15 | End: 2022-01-14 | Stop reason: DRUGHIGH

## 2021-12-15 NOTE — PROGRESS NOTES
Pt states she has been having bad headaches.  SOB with exertion, dizziness, heart racing fast.    Denies chest pain and swelling legs/feet    EKG today

## 2022-01-14 ENCOUNTER — OFFICE VISIT (OUTPATIENT)
Dept: CARDIOLOGY CLINIC | Age: 22
End: 2022-01-14
Payer: COMMERCIAL

## 2022-01-14 VITALS
DIASTOLIC BLOOD PRESSURE: 82 MMHG | WEIGHT: 133 LBS | SYSTOLIC BLOOD PRESSURE: 120 MMHG | HEIGHT: 68 IN | BODY MASS INDEX: 20.16 KG/M2 | HEART RATE: 88 BPM

## 2022-01-14 DIAGNOSIS — R00.0 SINUS TACHYCARDIA: Primary | ICD-10-CM

## 2022-01-14 PROCEDURE — G8427 DOCREV CUR MEDS BY ELIG CLIN: HCPCS | Performed by: INTERNAL MEDICINE

## 2022-01-14 PROCEDURE — 1036F TOBACCO NON-USER: CPT | Performed by: INTERNAL MEDICINE

## 2022-01-14 PROCEDURE — G8484 FLU IMMUNIZE NO ADMIN: HCPCS | Performed by: INTERNAL MEDICINE

## 2022-01-14 PROCEDURE — 99213 OFFICE O/P EST LOW 20 MIN: CPT | Performed by: INTERNAL MEDICINE

## 2022-01-14 PROCEDURE — G8420 CALC BMI NORM PARAMETERS: HCPCS | Performed by: INTERNAL MEDICINE

## 2022-01-14 RX ORDER — METOPROLOL TARTRATE 100 MG/1
100 TABLET ORAL 2 TIMES DAILY
Qty: 180 TABLET | Refills: 1 | Status: SHIPPED | OUTPATIENT
Start: 2022-01-14 | End: 2022-07-13 | Stop reason: CLARIF

## 2022-01-14 RX ORDER — LOSARTAN POTASSIUM 50 MG/1
50 TABLET ORAL DAILY
Qty: 90 TABLET | Refills: 1 | Status: SHIPPED | OUTPATIENT
Start: 2022-01-14 | End: 2022-07-13 | Stop reason: CLARIF

## 2022-01-14 RX ORDER — METOPROLOL TARTRATE 100 MG/1
100 TABLET ORAL 2 TIMES DAILY
COMMUNITY

## 2022-01-14 RX ORDER — LOSARTAN POTASSIUM 50 MG/1
50 TABLET ORAL DAILY
COMMUNITY
End: 2022-07-13 | Stop reason: SDUPTHER

## 2022-01-14 NOTE — PROGRESS NOTES
[facility],[place] [address] [address]  [place] [address]  Dept: [phone]  Dept Fax: [phone]  Loc: [phone]    Visit Date: 1/14/2022    Ms. Onel Montes is a 19 y.o. female  who presented for:  Chief Complaint   Patient presents with    Check-Up    Tachycardia       HPI:   18 yo F is here for a follow up. Had preeclapsia. Denies any chest pain, sob, palpitations, lightheadedness, dizziness, orthopnea, PND or pedal edema. Has been on Procardia XL 90mg and metoprolol 50mg BID. Her BP today is normal.    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 She is unable too much physicial activity due to tachycardia. Has been evlauated by EP for inappropriate sinus tachy. Currently titrating the dose of metoprolol        Current Outpatient Medications:     levonorgestrel (MIRENA, 52 MG,) IUD 52 mg, 1 each by IntraUTERine route once, Disp: , Rfl:     metoprolol tartrate (LOPRESSOR) 25 MG tablet, Take 2 tablets by mouth 2 times daily, Disp: 540 tablet, Rfl: 2    NIFEdipine (PROCARDIA XL) 90 MG extended release tablet, Take 1 tablet by mouth daily, Disp: 90 tablet, Rfl: 3    Multiple Vitamins-Minerals (MULTIVITAMIN WOMEN PO), Take by mouth daily, Disp: , Rfl:     losartan (COZAAR) 25 MG tablet, Take 1 tablet by mouth daily (Patient not taking: Reported on 1/14/2022), Disp: 90 tablet, Rfl: 1    Past Medical History  Ahsan Nguyen  has no past medical history on file. Social History  Ahsan Nguyen  reports that she has never smoked. She has never used smokeless tobacco.    Family History  Ahsan Nguyen family history includes Cancer in her maternal grandmother; Other in her mother. Past Surgical History   History reviewed. No pertinent surgical history. Subjective:     REVIEW OF SYSTEMS  Constitutional: denies sweats, chills and fever  HENT: denies  congestion, sinus pressure, sneezing and sore throat. Eyes: denies  pain, discharge, redness and itching. Respiratory: denies apnea, cough  Gastrointestinal: denies blood in stool, constipation, diarrhea   Endocrine: denies cold intolerance, heat intolerance, polydipsia. Genitourinary: denies dysuria, enuresis, flank pain and hematuria. Musculoskeletal: denies arthralgias, joint swelling and neck pain. Neurological: denies numbness and headaches. Psychiatric/Behavioral: denies agitation, confusion, decreased concentration and dysphoric mood    All others reviewed and are negative. Objective:     /82   Pulse 88   Ht 5' 8\" (1.727 m)   Wt 133 lb (60.3 kg)   BMI 20.22 kg/m²     Wt Readings from Last 3 Encounters:   01/14/22 133 lb (60.3 kg)   12/15/21 131 lb 3.2 oz (59.5 kg)   09/10/21 135 lb (61.2 kg)     BP Readings from Last 3 Encounters:   01/14/22 120/82   12/15/21 138/83   09/15/21 (!) 147/91       PHYSICAL EXAM  Constitutional: Oriented to person, place, and time. Appears well-developed and well-nourished. HENT:   Head: Normocephalic and atraumatic. Eyes: EOM are normal. Pupils are equal, round, and reactive to light. Neck: Normal range of motion. Neck supple. No JVD present. Cardiovascular: Normal rate , normal heart sounds and intact distal pulses. Pulmonary/Chest: Effort normal and breath sounds normal. No respiratory distress. No wheezes. No rales. Abdominal: Soft. Bowel sounds are normal. No distension. There is no tenderness. Musculoskeletal: Normal range of motion. No edema. Neurological: Alert and oriented to person, place, and time. No cranial nerve deficit. Coordination normal.   Skin: Skin is warm and dry. Psychiatric: Normal mood and affect.        No results found for: CKTOTAL, CKMB, CKMBINDEX    Lab Results   Component Value Date    WBC 5.5 07/31/2019    RBC 4.56 07/31/2019    HGB 14.0 07/31/2019    HCT 42.0 07/31/2019    MCV 92.0 07/31/2019    MCH 30.7 07/31/2019    MCHC 33.3 07/31/2019    RDW 12.9 07/31/2019     07/31/2019       Lab Results   Component Value Date     07/31/2019    K 3.9 07/31/2019     07/31/2019    CO2 25 07/31/2019    BUN 16 07/31/2019    CREATININE 0.89 07/31/2019    CALCIUM 9.40 07/31/2019    GLUCOSE 92 07/31/2019       No results found for: ALKPHOS, ALT, AST, PROT, BILITOT, BILIDIR, IBILI, LABALBU    No results found for: MG    No results found for: INR, PROTIME      No results found for: LABA1C    No results found for: TRIG, HDL, LDLCALC, LDLDIRECT, LABVLDL    Lab Results   Component Value Date    TSH 1.398 07/31/2019         Testing Reviewed:      I haveindividually reviewed the below cardiac tests    EKG:    ECHO:   Results for orders placed during the hospital encounter of 08/06/19   ECHO Complete 2D W Doppler W Color    Narrative Transthoracic Echocardiography Report (TTE)     Demographics      Patient Name  Ari Lowery Gender             Female                 M      MR #          [id number]      Race                                                  Ethnicity      Account #     [de-identified]      Room Number      Accession     [id number]      Date of Study      08/06/2019   Number      Date of Birth 2000     Referring          Paty Crews DO                                Physician          Kyra Truong MD      Age           22 year(s)     Sonographer        LINDSAY Wilson, RDCS,                                                   RDMS, RVT                                   Interpreting       Kyra Truong MD                                Physician     Procedure    Type of Study      TTE procedure:ECHOCARDIOGRAM COMPLETE 2D W DOPPLER W COLOR. Procedure Date  Date: 08/06/2019 Start: 08:16 AM    Study Location: Echo Lab  Technical Quality: Adequate visualization    Indications:Syncope.     Additional Medical History:chest pain, dizziness    Patient Status: Routine    Height: 67 inches Weight: 125 pounds BSA: 1.66 m^2 BMI: 19.58 kg/m^2    BP: 131/86 mmHg     Conclusions      Summary   Left ventricular size and systolic function is normal. Ejection fraction   was estimated at 55-60%. LV wall thickness is within normal limits and   there are no obvious wall motion abnormalities. Mild tricuspid regurgitation. Signature      ----------------------------------------------------------------   Electronically signed by Melita Shepherd MD (Interpreting   physician) on 08/06/2019 at 07:16 PM   ----------------------------------------------------------------      Findings      Mitral Valve   The mitral valve structure is normal with normal leaflet separation. DOPPLER: The transmitral velocity was within the normal range with no   evidence for mitral stenosis. There was no evidence of mitral   regurgitation. Aortic Valve   The aortic valve leaflets were not well visualized. The aortic valve appears to be trileaflet with good leaflet separation. DOPPLER: Transaortic velocity was within the normal range with no evidence   of aortic stenosis. There was no evidence of aortic regurgitation. Tricuspid Valve   The tricuspid valve structure is normal with normal leaflet separation. DOPPLER: There is no evidence of tricuspid stenosis. Mild tricuspid regurgitation. Pulmonic Valve   The pulmonic valve leaflets appear normal thickness, and normal cuspal   separation. DOPPLER: The transpulmonic velocity was within the normal   range. No evidence for regurgitation. Left Atrium   Left atrial size is normal.      Left Ventricle   Left ventricular size and systolic function is normal. Ejection fraction   was estimated at 55-60%. LV wall thickness is within normal limits and   there are no obvious wall motion abnormalities. Right Atrium   Right atrial size was normal.      Right Ventricle   The right ventricular size appears normal with normal systolic function   and wall thickness. Pericardial Effusion   The pericardium appears normal with no evidence of a pericardial effusion.       Pleural Effusion   No evidence of pleural effusion. Aorta / Great Vessels   -Aortic root dimension within normal limits. -IVC size is within normal limits with normal respiratory phasic changes.      M-Mode/2D Measurements & Calculations      LV Diastolic   LV Systolic Dimension:    AV Cusp Separation: 1.4 cmLA   Dimension: 4.1 2.8 cm                    Dimension: 2.8 cmAO Root   cm             LV Volume Diastolic: 53.0 Dimension: 2.3 cmLA Area: 14 cm^2   LV FS:31.7 %   ml   LV PW          LV Volume Systolic: 91.6   Diastolic: 0.8 ml   cm             LV EDV/LV EDV Index: 90.0 RV Diastolic Dimension: 1.1 cm   Septum         ml/45 m^2LV ESV/LV ESV   Diastolic: 0.7 Index: 13.6 ml/18 m^2     LA/Aorta: 1.22   cm             EF Calculated: 60.1 %     Ascending Aorta: 2.2 cm                                            LA volume/Index: 32.5 ml /20m^2     Doppler Measurements & Calculations      MV Peak E-Wave: 144 cm/s   AV Peak Velocity: 143  LVOT Peak Velocity: 124   MV Peak A-Wave: 60.5 cm/s  cm/s                   cm/s   MV E/A Ratio: 2.38         AV Peak Gradient: 8.18 LVOT Peak Gradient: 6   MV Peak Gradient: 8.29     mmHg                   mmHg   mmHg                                                     TV Peak E-Wave: 73.1   MV Deceleration Time: 183                         cm/s   msec                                              TV Peak A-Wave: 62.2                              IVRT: 49 msec          cm/s      MV E' Septal Velocity:                            TV Peak Gradient: 2.14   10.4 cm/s                  AV DVI (Vmax):0.87     mmHg   MV A' Septal Velocity: 6                          TR Velocity:228 cm/s   cm/s                                              TR Gradient:20.79 mmHg   MV E' Lateral Velocity: 19                        PV Peak Velocity: 83.2   cm/s                                              cm/s   MV A' Lateral Velocity:                           PV Peak Gradient: 2.77   9.7 cm/s

## 2022-01-14 NOTE — TELEPHONE ENCOUNTER
Pt called and updated on medication increases. Increase metoprolol tartrate to 75 mg BID and Losartan to 50 mg daily. Per her prescription bottle, pt has metoprolol tartrate 25 mg -- directions state take 3 tablets BID. So patient has already been taking 75 mg BID. Pt wanted to confirm that she needs to continue with nifedipine 90 mg daily as well. Updated Dr Reggie Nettles. Verbal order for patient to increase metoprolol tartrate to 100 mg BID, losartan to 50 mg daily, and continue with nifedipine 90 mg daily. Pt aware to keep BP log and let us know how pressures do with med increases. Med list updated. New scripts pended to be sent to pharmacy to be updated.

## 2022-07-13 ENCOUNTER — OFFICE VISIT (OUTPATIENT)
Dept: CARDIOLOGY CLINIC | Age: 22
End: 2022-07-13
Payer: COMMERCIAL

## 2022-07-13 VITALS
DIASTOLIC BLOOD PRESSURE: 72 MMHG | WEIGHT: 138.2 LBS | HEIGHT: 68 IN | SYSTOLIC BLOOD PRESSURE: 122 MMHG | BODY MASS INDEX: 20.95 KG/M2 | HEART RATE: 98 BPM

## 2022-07-13 DIAGNOSIS — G90.A POTS (POSTURAL ORTHOSTATIC TACHYCARDIA SYNDROME): Primary | ICD-10-CM

## 2022-07-13 PROCEDURE — G8420 CALC BMI NORM PARAMETERS: HCPCS | Performed by: INTERNAL MEDICINE

## 2022-07-13 PROCEDURE — 93000 ELECTROCARDIOGRAM COMPLETE: CPT | Performed by: INTERNAL MEDICINE

## 2022-07-13 PROCEDURE — 99214 OFFICE O/P EST MOD 30 MIN: CPT | Performed by: INTERNAL MEDICINE

## 2022-07-13 PROCEDURE — 1036F TOBACCO NON-USER: CPT | Performed by: INTERNAL MEDICINE

## 2022-07-13 PROCEDURE — G8427 DOCREV CUR MEDS BY ELIG CLIN: HCPCS | Performed by: INTERNAL MEDICINE

## 2022-07-13 RX ORDER — LOSARTAN POTASSIUM 50 MG/1
50 TABLET ORAL DAILY
Qty: 90 TABLET | Refills: 3 | Status: SHIPPED | OUTPATIENT
Start: 2022-07-13

## 2022-07-13 NOTE — PROGRESS NOTES
[address] [facility]  Dept: [phone]    CARDIAC ELECTROPHYSIOLOGY: CONSULTATION NOTE  PATIENT DEMOGRAPHICS:  Date:   7/13/2022  Patient name:              Ronaldo Wong  YOB: 2000  Sex: female   MRN:   [id number]    PRIMARY CARE PHYSICIAN:   Dez Garcia DO    REFERRING PROVIDER:  Ju Lombardi MD    REASON FOR CONSULTATION:  Follow up Inappropriate sinus tachycardia/POTS and hypertension. HISTORY OF PRESENT ILLNESS:  23/F with POTS/IST and hypertension is here for a follow up visit. She has no complaints today. Her blood pressure has been controlled following initiation of the losartan. No more palpitations. No chest pain, shortness of breath or lower extremity swelling. Active without any limitations. Medical hx: POTS symptomatic since 2019 (tilt 9/10/2021) on metoprolol,  hx of preeclampsia (2019), HTN on Procardia, hx of exertional syncope (2019). REVIEW OF SYSTEMS:    Constitutional: Negative for chills and fever  HENT: Negative for congestion, sinus pressure, sneezing and sore throat. Eyes: Negative for pain, discharge, redness and itching. Respiratory: Negative for apnea, cough  Gastrointestinal: Negative for blood in stool, constipation, diarrhea   Endocrine: Negative for cold intolerance, heat intolerance, polydipsia. Genitourinary: Negative for dysuria, enuresis, flank pain and hematuria. Musculoskeletal: Negative for arthralgias, joint swelling and neck pain. Neurological: Negative for numbness and headaches. Psychiatric/Behavioral: Negative for agitation, confusion, decreased concentration and dysphoric mood. PAST MEDICAL HISTORY:  Medical hx: hx of preeclampsia (2019) on Procardia and Labetalol-recently discontinued), persistent tachycardia (2019) on metoprolol, hx of exertional syncope (2019). PSH:    FAMILY HISTORY:  Nothing significant.   Family History   Problem Relation Age of Onset    Cancer Maternal Grandmother     Other Mother         autoimmune        SOCIAL HISTORY:  Single. She has 1 baby. She is currently breast-feeding her baby. Denies smoking or drinking alcohol or using recreational drugs. Social History     Socioeconomic History    Marital status: Single     Spouse name: Not on file    Number of children: Not on file    Years of education: Not on file    Highest education level: Not on file   Occupational History    Not on file   Tobacco Use    Smoking status: Never Smoker    Smokeless tobacco: Never Used   Vaping Use    Vaping Use: Former   Substance and Sexual Activity    Alcohol use: Not on file    Drug use: Not on file    Sexual activity: Not on file   Other Topics Concern    Not on file   Social History Narrative    Not on file     Social Determinants of Health     Financial Resource Strain: Low Risk     Difficulty of Paying Living Expenses: Not hard at all   Food Insecurity: No Food Insecurity    Worried About [employer] [employer] in the Last Year: Never true    [facility] [employer] in the Last Year: Never true   Transportation Needs:     Lack of Transportation (Medical): Not on file    Lack of Transportation (Non-Medical):  Not on file   Physical Activity:     Days of Exercise per Week: Not on file    Minutes of Exercise per Session: Not on file   Stress:     Feeling of Stress : Not on file   Social Connections:     Frequency of Communication with Friends and Family: Not on file    Frequency of Social Gatherings with Friends and Family: Not on file    Attends Catholic Services: Not on file    Active Member of Clubs or Organizations: Not on file    Attends Club or Organization Meetings: Not on file    Marital Status: Not on file   Intimate Partner Violence:     Fear of Current or Ex-Partner: Not on file    Emotionally Abused: Not on file    Physically Abused: Not on file    Sexually Abused: Not on file   Housing Stability:  Unable to Pay for Housing in the Last Year: Not on file    Number of Places Lived in the Last Year: Not on file    Unstable Housing in the Last Year: Not on file        ALLERGY HISTORY:  Allergies   Allergen Reactions    Penicillins Hives    Seasonal         MEDICATIONS:  Current Outpatient Medications   Medication Sig Dispense Refill    losartan (COZAAR) 50 MG tablet Take 50 mg by mouth daily      metoprolol (LOPRESSOR) 100 MG tablet Take 100 mg by mouth 2 times daily      metoprolol (LOPRESSOR) 100 MG tablet Take 1 tablet by mouth 2 times daily 180 tablet 1    losartan (COZAAR) 50 MG tablet Take 1 tablet by mouth daily 90 tablet 1    levonorgestrel (MIRENA, 52 MG,) IUD 52 mg 1 each by IntraUTERine route once      NIFEdipine (PROCARDIA XL) 90 MG extended release tablet Take 1 tablet by mouth daily 90 tablet 3    Multiple Vitamins-Minerals (MULTIVITAMIN WOMEN PO) Take by mouth daily       No current facility-administered medications for this visit. PHYSICAL EXAM:  Vitals:    07/13/22 0833   BP: 122/72   Pulse: 98     Body mass index is 21.01 kg/m². GENERAL: Alert and oriented. No distress. EYES: No pallor or icterus. ENT: No cyanosis. No thyromegaly or cervical LAP. VESSELS: No jugular venous distension or carotid bruits. HEART: Normal S1/S2. No murmur, rub or gallop. LUNGS: Clear to auscultation. ABDOMEN: Soft and non-tender. EXTREMITIES: No lower extremity edema. NEUROLOGICAL: Grossly normal.     LABORATORY DATA AND DIAGNOSTIC DATA:  I have personally reviewed and interpreted the results of the following diagnostic testing    Lab Results   Component Value Date    WBC 5.5 07/31/2019    HGB 14.0 07/31/2019    HCT 42.0 07/31/2019     07/31/2019     07/31/2019    K 3.9 07/31/2019     07/31/2019    CREATININE 0.89 07/31/2019    BUN 16 07/31/2019    CO2 25 07/31/2019    TSH 1.398 07/31/2019   Echocardiogram 5/28/2021: LVEF 55 to 60%. Normal left lower wall thickness. Moderate expiratory sedation. ECG sinus tachycardia and incomplete right bundle branch block. Holter monitor 8/19/2019: Heart rate varying between  bpm with an average heart rate of 79 bpm. No atrial/ventricular ectopy. Urinary and serum metanephrines/catecholamines: Within normal limits. IMPRESSION:  · POTS/IST, on metoprolol. · Hypertension, controlled. · History of exertional syncope. Normal echo. No recurrences. ASSESSMENT AND RECOMMENDATIONS:  The patient has done fairly well following initiation of losartan. Her blood pressure has been controlled. No more palpitations. In sinus rhythm. Continue metoprolol, Procardia and losartan. Follow-up in a year. Base metabolic panel. Thank you for allowing me to participate in the care of your patient. Please call me if you have any questions. **This report has been created using voice recognition software. It may contain minor errors which are inherent in voice recognition technology. **       Rylee Hart MD, MRCP, [facility], [facility] on 7/13/2022 at 9:08 AM

## 2023-01-06 ENCOUNTER — OFFICE VISIT (OUTPATIENT)
Dept: CARDIOLOGY CLINIC | Age: 23
End: 2023-01-06
Payer: COMMERCIAL

## 2023-01-06 VITALS
SYSTOLIC BLOOD PRESSURE: 136 MMHG | BODY MASS INDEX: 21.98 KG/M2 | HEART RATE: 92 BPM | HEIGHT: 68 IN | DIASTOLIC BLOOD PRESSURE: 70 MMHG | WEIGHT: 145 LBS

## 2023-01-06 DIAGNOSIS — I10 HYPERTENSION, UNSPECIFIED TYPE: Primary | ICD-10-CM

## 2023-01-06 PROCEDURE — 99214 OFFICE O/P EST MOD 30 MIN: CPT | Performed by: INTERNAL MEDICINE

## 2023-01-06 PROCEDURE — G8420 CALC BMI NORM PARAMETERS: HCPCS | Performed by: INTERNAL MEDICINE

## 2023-01-06 PROCEDURE — 3075F SYST BP GE 130 - 139MM HG: CPT | Performed by: INTERNAL MEDICINE

## 2023-01-06 PROCEDURE — G8484 FLU IMMUNIZE NO ADMIN: HCPCS | Performed by: INTERNAL MEDICINE

## 2023-01-06 PROCEDURE — G8427 DOCREV CUR MEDS BY ELIG CLIN: HCPCS | Performed by: INTERNAL MEDICINE

## 2023-01-06 PROCEDURE — 1036F TOBACCO NON-USER: CPT | Performed by: INTERNAL MEDICINE

## 2023-01-06 PROCEDURE — 3078F DIAST BP <80 MM HG: CPT | Performed by: INTERNAL MEDICINE

## 2023-01-06 NOTE — PROGRESS NOTES
[address] [place] [address],[place] [address]  [place] [address]  Dept: [phone]  Dept Fax: [phone]  Loc: [phone]    Visit Date: 1/6/2023    Ms. Kin Israel is a 23 y.o. female  who presented for:  Chief Complaint   Patient presents with    1 Year Follow Up       HPI:   24 yo F is here for a follow up. Had preeclapsia. Denies any chest pain, sob, palpitations, lightheadedness, dizziness, orthopnea, PND or pedal edema. Has been on Procardia XL 90mg and metoprolol 50mg BID. Her BP today is normal.    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 She is unable too much physicial activity due to tachycardia. Has been evlauated by EP for inappropriate sinus tachy. Currently titrating the dose of metoprolol            Current Outpatient Medications:     losartan (COZAAR) 50 MG tablet, Take 1 tablet by mouth daily, Disp: 90 tablet, Rfl: 3    metoprolol (LOPRESSOR) 100 MG tablet, Take 100 mg by mouth 2 times daily, Disp: , Rfl:     levonorgestrel (MIRENA, 52 MG,) IUD 52 mg, 1 each by IntraUTERine route once, Disp: , Rfl:     NIFEdipine (PROCARDIA XL) 90 MG extended release tablet, Take 1 tablet by mouth daily, Disp: 90 tablet, Rfl: 3    Past Medical History  Sergey Hernandez  has no past medical history on file. Social History  Sergey Hernandez  reports that she has never smoked. She has never used smokeless tobacco.    Family History  Sergey Hernandez family history includes Cancer in her maternal grandmother; Other in her mother. Past Surgical History   No past surgical history on file. Subjective:     REVIEW OF SYSTEMS  Constitutional: denies sweats, chills and fever  HENT: denies  congestion, sinus pressure, sneezing and sore throat. Eyes: denies  pain, discharge, redness and itching. Respiratory: denies apnea, cough  Gastrointestinal: denies blood in stool, constipation, diarrhea   Endocrine: denies cold intolerance, heat intolerance, polydipsia.   Genitourinary: denies dysuria, enuresis, flank pain and hematuria. Musculoskeletal: denies arthralgias, joint swelling and neck pain. Neurological: denies numbness and headaches. Psychiatric/Behavioral: denies agitation, confusion, decreased concentration and dysphoric mood    All others reviewed and are negative. Objective:     /70   Pulse 92   Ht 5' 8\" (1.727 m)   Wt 145 lb (65.8 kg)   BMI 22.05 kg/m²     Wt Readings from Last 3 Encounters:   01/06/23 145 lb (65.8 kg)   07/13/22 138 lb 3.2 oz (62.7 kg)   01/14/22 133 lb (60.3 kg)     BP Readings from Last 3 Encounters:   01/06/23 136/70   07/13/22 122/72   01/14/22 120/82       PHYSICAL EXAM  Constitutional: Oriented to person, place, and time. Appears well-developed and well-nourished. HENT:   Head: Normocephalic and atraumatic. Eyes: EOM are normal. Pupils are equal, round, and reactive to light. Neck: Normal range of motion. Neck supple. No JVD present. Cardiovascular: Normal rate , normal heart sounds and intact distal pulses. Pulmonary/Chest: Effort normal and breath sounds normal. No respiratory distress. No wheezes. No rales. Abdominal: Soft. Bowel sounds are normal. No distension. There is no tenderness. Musculoskeletal: Normal range of motion. No edema. Neurological: Alert and oriented to person, place, and time. No cranial nerve deficit. Coordination normal.   Skin: Skin is warm and dry. Psychiatric: Normal mood and affect.        No results found for: CKTOTAL, CKMB, CKMBINDEX    Lab Results   Component Value Date/Time    WBC 5.5 07/31/2019 12:50 PM    RBC 4.56 07/31/2019 12:50 PM    HGB 14.0 07/31/2019 12:50 PM    HCT 42.0 07/31/2019 12:50 PM    MCV 92.0 07/31/2019 12:50 PM    MCH 30.7 07/31/2019 12:50 PM    MCHC 33.3 07/31/2019 12:50 PM    RDW 12.9 07/31/2019 12:50 PM     07/31/2019 12:50 PM       Lab Results   Component Value Date/Time     07/31/2019 12:50 PM    K 3.9 07/31/2019 12:50 PM     07/31/2019 12:50 PM    CO2 25 07/31/2019 12:50 PM    BUN 16 07/31/2019 12:50 PM    CREATININE 0.89 07/31/2019 12:50 PM    CALCIUM 9.40 07/31/2019 12:50 PM    GLUCOSE 92 07/31/2019 12:50 PM       No results found for: ALKPHOS, ALT, AST, PROT, BILITOT, BILIDIR, IBILI, LABALBU    No results found for: MG    No results found for: INR, PROTIME      No results found for: LABA1C    No results found for: TRIG, HDL, LDLCALC, LDLDIRECT, LABVLDL    Lab Results   Component Value Date/Time    TSH 1.398 07/31/2019 12:50 PM         Testing Reviewed:      I haveindividually reviewed the below cardiac tests    EKG:    ECHO:   Results for orders placed during the hospital encounter of 08/06/19   ECHO Complete 2D W Doppler W Color    Narrative Transthoracic Echocardiography Report (TTE)     Demographics      Patient Name  Geri Villaseñor Gender             Female                 M      MR #          [id number]      Race                                                  Ethnicity      Account #     [de-identified]      Room Number      Accession     [id number]      Date of Study      08/06/2019   Number      Date of Birth 2000     Referring          Tai Gray DO                                Physician          Esequiel Ortiz MD      Age           22 year(s)     Sonographer        LINDSAY Hamilton, RDCS,                                                   RDMS, RVT                                   Interpreting       Esequiel Ortiz MD                                Physician     Procedure    Type of Study      TTE procedure:ECHOCARDIOGRAM COMPLETE 2D W DOPPLER W COLOR. Procedure Date  Date: 08/06/2019 Start: 08:16 AM    Study Location: Echo Lab  Technical Quality: Adequate visualization    Indications:Syncope.     Additional Medical History:chest pain, dizziness    Patient Status: Routine    Height: 67 inches Weight: 125 pounds BSA: 1.66 m^2 BMI: 19.58 kg/m^2    BP: 131/86 mmHg     Conclusions      Summary   Left ventricular size and systolic function is normal. Ejection fraction   was estimated at 55-60%. LV wall thickness is within normal limits and   there are no obvious wall motion abnormalities. Mild tricuspid regurgitation. Signature      ----------------------------------------------------------------   Electronically signed by Gay Vanegas MD (Interpreting   physician) on 08/06/2019 at 07:16 PM   ----------------------------------------------------------------      Findings      Mitral Valve   The mitral valve structure is normal with normal leaflet separation. DOPPLER: The transmitral velocity was within the normal range with no   evidence for mitral stenosis. There was no evidence of mitral   regurgitation. Aortic Valve   The aortic valve leaflets were not well visualized. The aortic valve appears to be trileaflet with good leaflet separation. DOPPLER: Transaortic velocity was within the normal range with no evidence   of aortic stenosis. There was no evidence of aortic regurgitation. Tricuspid Valve   The tricuspid valve structure is normal with normal leaflet separation. DOPPLER: There is no evidence of tricuspid stenosis. Mild tricuspid regurgitation. Pulmonic Valve   The pulmonic valve leaflets appear normal thickness, and normal cuspal   separation. DOPPLER: The transpulmonic velocity was within the normal   range. No evidence for regurgitation. Left Atrium   Left atrial size is normal.      Left Ventricle   Left ventricular size and systolic function is normal. Ejection fraction   was estimated at 55-60%. LV wall thickness is within normal limits and   there are no obvious wall motion abnormalities. Right Atrium   Right atrial size was normal.      Right Ventricle   The right ventricular size appears normal with normal systolic function   and wall thickness. Pericardial Effusion   The pericardium appears normal with no evidence of a pericardial effusion.       Pleural Effusion   No evidence of pleural effusion. Aorta / Great Vessels   -Aortic root dimension within normal limits. -IVC size is within normal limits with normal respiratory phasic changes.      M-Mode/2D Measurements & Calculations      LV Diastolic   LV Systolic Dimension:    AV Cusp Separation: 1.4 cmLA   Dimension: 4.1 2.8 cm                    Dimension: 2.8 cmAO Root   cm             LV Volume Diastolic: 10.9 Dimension: 2.3 cmLA Area: 14 cm^2   LV FS:31.7 %   ml   LV PW          LV Volume Systolic: 25.1   Diastolic: 0.8 ml   cm             LV EDV/LV EDV Index: 93.8 RV Diastolic Dimension: 1.1 cm   Septum         ml/45 m^2LV ESV/LV ESV   Diastolic: 0.7 Index: 11.5 ml/18 m^2     LA/Aorta: 1.22   cm             EF Calculated: 60.1 %     Ascending Aorta: 2.2 cm                                            LA volume/Index: 32.5 ml /20m^2     Doppler Measurements & Calculations      MV Peak E-Wave: 144 cm/s   AV Peak Velocity: 143  LVOT Peak Velocity: 124   MV Peak A-Wave: 60.5 cm/s  cm/s                   cm/s   MV E/A Ratio: 2.38         AV Peak Gradient: 8.18 LVOT Peak Gradient: 6   MV Peak Gradient: 8.29     mmHg                   mmHg   mmHg                                                     TV Peak E-Wave: 73.1   MV Deceleration Time: 183                         cm/s   msec                                              TV Peak A-Wave: 62.2                              IVRT: 49 msec          cm/s      MV E' Septal Velocity:                            TV Peak Gradient: 2.14   10.4 cm/s                  AV DVI (Vmax):0.87     mmHg   MV A' Septal Velocity: 6                          TR Velocity:228 cm/s   cm/s                                              TR Gradient:20.79 mmHg   MV E' Lateral Velocity: 19                        PV Peak Velocity: 83.2   cm/s                                              cm/s   MV A' Lateral Velocity:                           PV Peak Gradient: 2.77   9.7 cm/s mmHg   E/E' septal: 13.85   E/E' lateral: 7.58                                                     AR ED Velocity: 157 cm/s     [email or web address]       STRESS:    CATH:    Assessment/Plan       Diagnosis Orders   1. Hypertension, unspecified type              Hx Sinus tachycardia  HTN-controlled  Hx Syncope    Reviewed EKG, Echo, Stress test and Holter  Reviewed CTA chest  Advised to stay well hydrated  On metoprolol 50mg BID   Saw  Dr. 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 The patient is asked to make an attempt to improve diet and exercise patterns to aid in medical management of this problem. Advised more plant based nutrition/meditarrean diet   Advised patient to call office or seek immediate medical attention if there is any new onset of  any chest pain, sob, palpitations, lightheadedness, dizziness, orthopnea, PND or pedal edema. All medication side effects were discussed in details. Thank youfor allowing me to participate in the care of this patient. Please do not hesitate to contact me for any further questions. Return if symptoms worsen or fail to improve, for Review testing.        Electronically signed by Lilyan Felty, MD [facility] [place]  1/6/2023 at 11:11 AM

## 2023-07-12 ENCOUNTER — OFFICE VISIT (OUTPATIENT)
Dept: CARDIOLOGY CLINIC | Age: 23
End: 2023-07-12
Payer: COMMERCIAL

## 2023-07-12 VITALS
DIASTOLIC BLOOD PRESSURE: 80 MMHG | HEIGHT: 68 IN | HEART RATE: 91 BPM | WEIGHT: 135.2 LBS | SYSTOLIC BLOOD PRESSURE: 147 MMHG | OXYGEN SATURATION: 96 % | BODY MASS INDEX: 20.49 KG/M2

## 2023-07-12 DIAGNOSIS — G90.A POTS (POSTURAL ORTHOSTATIC TACHYCARDIA SYNDROME): Primary | ICD-10-CM

## 2023-07-12 PROCEDURE — G8420 CALC BMI NORM PARAMETERS: HCPCS | Performed by: INTERNAL MEDICINE

## 2023-07-12 PROCEDURE — 1036F TOBACCO NON-USER: CPT | Performed by: INTERNAL MEDICINE

## 2023-07-12 PROCEDURE — G8427 DOCREV CUR MEDS BY ELIG CLIN: HCPCS | Performed by: INTERNAL MEDICINE

## 2023-07-12 PROCEDURE — 93000 ELECTROCARDIOGRAM COMPLETE: CPT | Performed by: INTERNAL MEDICINE

## 2023-07-12 PROCEDURE — 99214 OFFICE O/P EST MOD 30 MIN: CPT | Performed by: INTERNAL MEDICINE

## 2023-07-12 NOTE — PATIENT INSTRUCTIONS
You may receive a survey regarding the care you received during your visit. Your input is valuable to us. We encourage you to complete and return your survey. We hope you will choose us in the future for your healthcare needs. Thank you for choosing [facility]!     Your Medical Assistant Today:  Zina Gallo      Your RN Today:  Zina Gallo  Your Provider for Today: Dr. Sage Batres  Ph. [phone] opt 1

## 2023-07-12 NOTE — PROGRESS NOTES
tachycardia and incomplete right bundle branch block. Holter monitor 8/19/2019: Heart rate varying between  bpm with an average heart rate of 79 bpm. No atrial/ventricular ectopy. Urinary and serum metanephrines/catecholamines: Within normal limits. IMPRESSION:  POTS/IST, on metoprolol. Hypertension, controlled on losartan. History of exertional syncope. Normal echo. No recurrences. ASSESSMENT AND RECOMMENDATIONS:  C rate is doing well. Heart rate and blood pressure is fairly controlled with combination therapy. Tolerating medications. Ontinue metoprolol, Procardia and losartan. Follow-up in 6 months. Thank you for allowing me to participate in the care of your patient. Please call me if you have any questions. **This report has been created using voice recognition software. It may contain minor errors which are inherent in voice recognition technology. **       Anu Shannon MD, MRCP, [facility], [facility] on 7/12/2023 at 12:29 PM